# Patient Record
Sex: FEMALE | Race: WHITE | NOT HISPANIC OR LATINO | Employment: FULL TIME | URBAN - METROPOLITAN AREA
[De-identification: names, ages, dates, MRNs, and addresses within clinical notes are randomized per-mention and may not be internally consistent; named-entity substitution may affect disease eponyms.]

---

## 2016-08-25 LAB — EXTERNAL CHLAMYDIA SCREEN: NEGATIVE

## 2016-09-07 LAB — EXTERNAL ANTIBODY SCREEN: NEGATIVE

## 2017-02-15 LAB — EXTERNAL GROUP B STREP ANTIGEN: NEGATIVE

## 2017-02-16 ENCOUNTER — ROUTINE PRENATAL (OUTPATIENT)
Dept: OBSTETRICS AND GYNECOLOGY | Facility: CLINIC | Age: 27
End: 2017-02-16

## 2017-02-16 VITALS — DIASTOLIC BLOOD PRESSURE: 70 MMHG | WEIGHT: 181 LBS | BODY MASS INDEX: 34.2 KG/M2 | SYSTOLIC BLOOD PRESSURE: 118 MMHG

## 2017-02-16 DIAGNOSIS — Z3A.36 36 WEEKS GESTATION OF PREGNANCY: Primary | ICD-10-CM

## 2017-02-16 PROCEDURE — 99213 OFFICE O/P EST LOW 20 MIN: CPT | Performed by: OBSTETRICS & GYNECOLOGY

## 2017-02-16 RX ORDER — FERROUS SULFATE 325(65) MG
325 TABLET ORAL
COMMUNITY
End: 2022-12-13

## 2017-02-16 NOTE — PROGRESS NOTES
She has a sore right wrist and thumb probably from crafting and typing , no contractions  The baby was rushes 6-1/2 pounds 2 weeks ago on ultrasound.  Her cervix is posterior soft there is guarding I don't think it's dilated at all.  Her birth weight was 8#7 and his was 7-1/2 pounds.  Discussed the induction around 39 weeks as baby clinically appears to be large.  That may involve a James bulb.  I'll be out of town until the fourth.  Her EDC is 3/10/17- I have an off site clinic Monday, March 6.  We'll discuss further next week.  I'll have her see Renuka the week I am out of town February 27 through March 3

## 2017-02-23 ENCOUNTER — ROUTINE PRENATAL (OUTPATIENT)
Dept: OBSTETRICS AND GYNECOLOGY | Facility: CLINIC | Age: 27
End: 2017-02-23

## 2017-02-23 VITALS — DIASTOLIC BLOOD PRESSURE: 70 MMHG | WEIGHT: 182 LBS | BODY MASS INDEX: 34.39 KG/M2 | SYSTOLIC BLOOD PRESSURE: 118 MMHG

## 2017-02-23 DIAGNOSIS — Z3A.37 37 WEEKS GESTATION OF PREGNANCY: Primary | ICD-10-CM

## 2017-02-23 PROCEDURE — 0502F SUBSEQUENT PRENATAL CARE: CPT | Performed by: OBSTETRICS & GYNECOLOGY

## 2017-02-23 NOTE — PROGRESS NOTES
No contractions yet good FM MALE questionably birth weight I would assessment approximately 8 pounds at term  GBS is reportedly negative from Renuka we do not have the lab report.  I discussed that intercourse is certainly okay and might cause some contractions when the patient asked

## 2017-03-06 ENCOUNTER — HOSPITAL ENCOUNTER (INPATIENT)
Facility: HOSPITAL | Age: 27
LOS: 4 days | Discharge: HOME | End: 2017-03-10
Attending: OBSTETRICS & GYNECOLOGY | Admitting: OBSTETRICS & GYNECOLOGY

## 2017-03-06 DIAGNOSIS — Z98.891 STATUS POST CESAREAN SECTION: ICD-10-CM

## 2017-03-06 LAB
ABO GROUP BLD: NORMAL
BLD GP AB SCN SERPL QL: NEGATIVE
DEPRECATED RDW RBC AUTO: 48.5 FL (ref 37–54)
ERYTHROCYTE [DISTWIDTH] IN BLOOD BY AUTOMATED COUNT: 13.8 % (ref 11.3–14.5)
HCT VFR BLD AUTO: 37.6 % (ref 34.5–44)
HGB BLD-MCNC: 12.5 G/DL (ref 11.5–15.5)
MCH RBC QN AUTO: 31.8 PG (ref 27–31)
MCHC RBC AUTO-ENTMCNC: 33.2 G/DL (ref 32–36)
MCV RBC AUTO: 95.7 FL (ref 80–99)
PLATELET # BLD AUTO: 383 10*3/MM3 (ref 150–450)
PMV BLD AUTO: 10.1 FL (ref 6–12)
RBC # BLD AUTO: 3.93 10*6/MM3 (ref 3.89–5.14)
RH BLD: POSITIVE
WBC NRBC COR # BLD: 12.47 10*3/MM3 (ref 3.5–10.8)

## 2017-03-06 PROCEDURE — 86900 BLOOD TYPING SEROLOGIC ABO: CPT | Performed by: OBSTETRICS & GYNECOLOGY

## 2017-03-06 PROCEDURE — 86850 RBC ANTIBODY SCREEN: CPT | Performed by: OBSTETRICS & GYNECOLOGY

## 2017-03-06 PROCEDURE — 59025 FETAL NON-STRESS TEST: CPT

## 2017-03-06 PROCEDURE — 86901 BLOOD TYPING SEROLOGIC RH(D): CPT | Performed by: OBSTETRICS & GYNECOLOGY

## 2017-03-06 PROCEDURE — 85027 COMPLETE CBC AUTOMATED: CPT | Performed by: OBSTETRICS & GYNECOLOGY

## 2017-03-06 RX ORDER — PROMETHAZINE HYDROCHLORIDE 12.5 MG/1
12.5 SUPPOSITORY RECTAL EVERY 6 HOURS PRN
Status: DISCONTINUED | OUTPATIENT
Start: 2017-03-06 | End: 2017-03-07 | Stop reason: SDUPTHER

## 2017-03-06 RX ORDER — PROMETHAZINE HYDROCHLORIDE 25 MG/ML
12.5 INJECTION, SOLUTION INTRAMUSCULAR; INTRAVENOUS EVERY 6 HOURS PRN
Status: DISCONTINUED | OUTPATIENT
Start: 2017-03-06 | End: 2017-03-07 | Stop reason: SDUPTHER

## 2017-03-06 RX ORDER — ZOLPIDEM TARTRATE 5 MG/1
5 TABLET ORAL NIGHTLY PRN
Status: DISCONTINUED | OUTPATIENT
Start: 2017-03-06 | End: 2017-03-07 | Stop reason: SDUPTHER

## 2017-03-06 RX ORDER — DIPHENHYDRAMINE HCL 25 MG
25 CAPSULE ORAL NIGHTLY PRN
Status: DISCONTINUED | OUTPATIENT
Start: 2017-03-06 | End: 2017-03-07 | Stop reason: SDUPTHER

## 2017-03-06 RX ORDER — OXYTOCIN/RINGER'S LACTATE 30/500 ML
2-24 PLASTIC BAG, INJECTION (ML) INTRAVENOUS CONTINUOUS
Status: DISCONTINUED | OUTPATIENT
Start: 2017-03-06 | End: 2017-03-07

## 2017-03-06 RX ORDER — LIDOCAINE HYDROCHLORIDE 10 MG/ML
5 INJECTION, SOLUTION INFILTRATION; PERINEURAL AS NEEDED
Status: DISCONTINUED | OUTPATIENT
Start: 2017-03-06 | End: 2017-03-07 | Stop reason: HOSPADM

## 2017-03-06 RX ORDER — SODIUM CHLORIDE, SODIUM LACTATE, POTASSIUM CHLORIDE, CALCIUM CHLORIDE 600; 310; 30; 20 MG/100ML; MG/100ML; MG/100ML; MG/100ML
125 INJECTION, SOLUTION INTRAVENOUS CONTINUOUS
Status: DISCONTINUED | OUTPATIENT
Start: 2017-03-06 | End: 2017-03-07 | Stop reason: SDUPTHER

## 2017-03-06 RX ORDER — SODIUM CHLORIDE 0.9 % (FLUSH) 0.9 %
1-10 SYRINGE (ML) INJECTION AS NEEDED
Status: DISCONTINUED | OUTPATIENT
Start: 2017-03-06 | End: 2017-03-07 | Stop reason: HOSPADM

## 2017-03-06 RX ORDER — ACETAMINOPHEN 325 MG/1
650 TABLET ORAL EVERY 4 HOURS PRN
Status: DISCONTINUED | OUTPATIENT
Start: 2017-03-06 | End: 2017-03-07 | Stop reason: SDUPTHER

## 2017-03-06 RX ORDER — DIPHENHYDRAMINE HYDROCHLORIDE 50 MG/ML
25 INJECTION INTRAMUSCULAR; INTRAVENOUS NIGHTLY PRN
Status: DISCONTINUED | OUTPATIENT
Start: 2017-03-06 | End: 2017-03-07 | Stop reason: SDUPTHER

## 2017-03-06 RX ORDER — PROMETHAZINE HYDROCHLORIDE 12.5 MG/1
12.5 TABLET ORAL EVERY 6 HOURS PRN
Status: DISCONTINUED | OUTPATIENT
Start: 2017-03-06 | End: 2017-03-07 | Stop reason: SDUPTHER

## 2017-03-06 RX ADMIN — SODIUM CHLORIDE, POTASSIUM CHLORIDE, SODIUM LACTATE AND CALCIUM CHLORIDE 125 ML/HR: 600; 310; 30; 20 INJECTION, SOLUTION INTRAVENOUS at 22:05

## 2017-03-06 RX ADMIN — Medication 2 MILLI-UNITS/MIN: at 22:38

## 2017-03-07 ENCOUNTER — ANESTHESIA (OUTPATIENT)
Dept: LABOR AND DELIVERY | Facility: HOSPITAL | Age: 27
End: 2017-03-07

## 2017-03-07 ENCOUNTER — ANESTHESIA EVENT (OUTPATIENT)
Dept: LABOR AND DELIVERY | Facility: HOSPITAL | Age: 27
End: 2017-03-07

## 2017-03-07 PROBLEM — Z3A.39 39 WEEKS GESTATION OF PREGNANCY: Status: ACTIVE | Noted: 2017-02-16

## 2017-03-07 PROBLEM — Z34.03 ENCOUNTER FOR SUPERVISION OF NORMAL FIRST PREGNANCY IN THIRD TRIMESTER: Status: RESOLVED | Noted: 2017-03-07 | Resolved: 2017-03-07

## 2017-03-07 PROBLEM — Z34.03 ENCOUNTER FOR SUPERVISION OF NORMAL FIRST PREGNANCY IN THIRD TRIMESTER: Status: ACTIVE | Noted: 2017-03-07

## 2017-03-07 LAB
AMPHET+METHAMPHET UR QL: NEGATIVE
AMPHETAMINES UR QL: NEGATIVE
BARBITURATES UR QL SCN: NEGATIVE
BENZODIAZ UR QL SCN: NEGATIVE
BUPRENORPHINE SERPL-MCNC: NEGATIVE NG/ML
CANNABINOIDS SERPL QL: NEGATIVE
COCAINE UR QL: NEGATIVE
METHADONE UR QL SCN: NEGATIVE
OPIATES UR QL: NEGATIVE
OXYCODONE UR QL SCN: NEGATIVE
PCP UR QL SCN: NEGATIVE
PROPOXYPH UR QL: NEGATIVE
TRICYCLICS UR QL SCN: NEGATIVE

## 2017-03-07 PROCEDURE — 59514 CESAREAN DELIVERY ONLY: CPT | Performed by: OBSTETRICS & GYNECOLOGY

## 2017-03-07 PROCEDURE — 25010000002 BUTORPHANOL PER 1 MG: Performed by: OBSTETRICS & GYNECOLOGY

## 2017-03-07 PROCEDURE — C1755 CATHETER, INTRASPINAL: HCPCS

## 2017-03-07 PROCEDURE — 25010000002 ONDANSETRON PER 1 MG: Performed by: ANESTHESIOLOGY

## 2017-03-07 PROCEDURE — C1755 CATHETER, INTRASPINAL: HCPCS | Performed by: ANESTHESIOLOGY

## 2017-03-07 PROCEDURE — 25010000003 MORPHINE PER 10 MG: Performed by: ANESTHESIOLOGY

## 2017-03-07 PROCEDURE — 80306 DRUG TEST PRSMV INSTRMNT: CPT | Performed by: OBSTETRICS & GYNECOLOGY

## 2017-03-07 PROCEDURE — 25010000002 ROPIVACAINE PER 1 MG: Performed by: ANESTHESIOLOGY

## 2017-03-07 PROCEDURE — 25010000002 METOCLOPRAMIDE PER 10 MG: Performed by: ANESTHESIOLOGY

## 2017-03-07 PROCEDURE — 25010000002 MIDAZOLAM PER 1 MG: Performed by: ANESTHESIOLOGY

## 2017-03-07 PROCEDURE — 59025 FETAL NON-STRESS TEST: CPT

## 2017-03-07 PROCEDURE — 25010000002 FENTANYL CITRATE (PF) 100 MCG/2ML SOLUTION: Performed by: ANESTHESIOLOGY

## 2017-03-07 PROCEDURE — 25010000003 CEFAZOLIN IN DEXTROSE 2-4 GM/100ML-% SOLUTION: Performed by: OBSTETRICS & GYNECOLOGY

## 2017-03-07 RX ORDER — METHYLERGONOVINE MALEATE 0.2 MG/ML
200 INJECTION INTRAVENOUS AS NEEDED
Status: DISCONTINUED | OUTPATIENT
Start: 2017-03-07 | End: 2017-03-07 | Stop reason: HOSPADM

## 2017-03-07 RX ORDER — ACETAMINOPHEN 325 MG/1
650 TABLET ORAL EVERY 4 HOURS PRN
Status: DISCONTINUED | OUTPATIENT
Start: 2017-03-07 | End: 2017-03-07 | Stop reason: SDUPTHER

## 2017-03-07 RX ORDER — PROMETHAZINE HYDROCHLORIDE 12.5 MG/1
12.5 TABLET ORAL EVERY 6 HOURS PRN
Status: DISCONTINUED | OUTPATIENT
Start: 2017-03-07 | End: 2017-03-07 | Stop reason: HOSPADM

## 2017-03-07 RX ORDER — PRENATAL WITH FERROUS FUM AND FOLIC ACID 3080; 920; 120; 400; 22; 1.84; 3; 20; 10; 1; 12; 200; 27; 25; 2 [IU]/1; [IU]/1; MG/1; [IU]/1; MG/1; MG/1; MG/1; MG/1; MG/1; MG/1; UG/1; MG/1; MG/1; MG/1; MG/1
1 TABLET ORAL DAILY
Status: DISCONTINUED | OUTPATIENT
Start: 2017-03-08 | End: 2017-03-10 | Stop reason: HOSPADM

## 2017-03-07 RX ORDER — FAMOTIDINE 10 MG/ML
20 INJECTION, SOLUTION INTRAVENOUS ONCE AS NEEDED
Status: COMPLETED | OUTPATIENT
Start: 2017-03-07 | End: 2017-03-07

## 2017-03-07 RX ORDER — MORPHINE SULFATE 4 MG/ML
2 INJECTION, SOLUTION INTRAMUSCULAR; INTRAVENOUS
Status: DISCONTINUED | OUTPATIENT
Start: 2017-03-07 | End: 2017-03-07 | Stop reason: SDUPTHER

## 2017-03-07 RX ORDER — ACETAMINOPHEN 325 MG/1
650 TABLET ORAL EVERY 4 HOURS PRN
Status: DISCONTINUED | OUTPATIENT
Start: 2017-03-07 | End: 2017-03-07 | Stop reason: HOSPADM

## 2017-03-07 RX ORDER — OXYCODONE HYDROCHLORIDE AND ACETAMINOPHEN 5; 325 MG/1; MG/1
2 TABLET ORAL EVERY 4 HOURS PRN
Status: DISCONTINUED | OUTPATIENT
Start: 2017-03-07 | End: 2017-03-07 | Stop reason: HOSPADM

## 2017-03-07 RX ORDER — LIDOCAINE HYDROCHLORIDE 10 MG/ML
5 INJECTION, SOLUTION INFILTRATION; PERINEURAL AS NEEDED
Status: DISCONTINUED | OUTPATIENT
Start: 2017-03-07 | End: 2017-03-07 | Stop reason: HOSPADM

## 2017-03-07 RX ORDER — ZOLPIDEM TARTRATE 5 MG/1
5 TABLET ORAL NIGHTLY PRN
Status: DISCONTINUED | OUTPATIENT
Start: 2017-03-07 | End: 2017-03-10 | Stop reason: HOSPADM

## 2017-03-07 RX ORDER — DIPHENHYDRAMINE HYDROCHLORIDE 50 MG/ML
25 INJECTION INTRAMUSCULAR; INTRAVENOUS NIGHTLY PRN
Status: DISCONTINUED | OUTPATIENT
Start: 2017-03-07 | End: 2017-03-07 | Stop reason: HOSPADM

## 2017-03-07 RX ORDER — LIDOCAINE HYDROCHLORIDE AND EPINEPHRINE BITARTRATE 20; .01 MG/ML; MG/ML
INJECTION, SOLUTION SUBCUTANEOUS AS NEEDED
Status: DISCONTINUED | OUTPATIENT
Start: 2017-03-07 | End: 2017-03-07 | Stop reason: SURG

## 2017-03-07 RX ORDER — DIPHENHYDRAMINE HCL 25 MG
25 CAPSULE ORAL NIGHTLY PRN
Status: DISCONTINUED | OUTPATIENT
Start: 2017-03-07 | End: 2017-03-07 | Stop reason: HOSPADM

## 2017-03-07 RX ORDER — ZOLPIDEM TARTRATE 5 MG/1
5 TABLET ORAL NIGHTLY PRN
Status: DISCONTINUED | OUTPATIENT
Start: 2017-03-07 | End: 2017-03-07 | Stop reason: HOSPADM

## 2017-03-07 RX ORDER — SODIUM CHLORIDE, SODIUM LACTATE, POTASSIUM CHLORIDE, CALCIUM CHLORIDE 600; 310; 30; 20 MG/100ML; MG/100ML; MG/100ML; MG/100ML
125 INJECTION, SOLUTION INTRAVENOUS CONTINUOUS
Status: DISCONTINUED | OUTPATIENT
Start: 2017-03-07 | End: 2017-03-07 | Stop reason: SDUPTHER

## 2017-03-07 RX ORDER — CEFAZOLIN SODIUM 2 G/100ML
2 INJECTION, SOLUTION INTRAVENOUS EVERY 8 HOURS
Status: DISCONTINUED | OUTPATIENT
Start: 2017-03-08 | End: 2017-03-07

## 2017-03-07 RX ORDER — SODIUM CHLORIDE 0.9 % (FLUSH) 0.9 %
1-10 SYRINGE (ML) INJECTION AS NEEDED
Status: DISCONTINUED | OUTPATIENT
Start: 2017-03-07 | End: 2017-03-10 | Stop reason: HOSPADM

## 2017-03-07 RX ORDER — OXYCODONE HYDROCHLORIDE AND ACETAMINOPHEN 5; 325 MG/1; MG/1
1 TABLET ORAL EVERY 4 HOURS PRN
Status: DISCONTINUED | OUTPATIENT
Start: 2017-03-07 | End: 2017-03-08

## 2017-03-07 RX ORDER — PROMETHAZINE HYDROCHLORIDE 12.5 MG/1
12.5 TABLET ORAL EVERY 6 HOURS PRN
Status: DISCONTINUED | OUTPATIENT
Start: 2017-03-07 | End: 2017-03-07 | Stop reason: SDUPTHER

## 2017-03-07 RX ORDER — MIDAZOLAM HYDROCHLORIDE 1 MG/ML
INJECTION INTRAMUSCULAR; INTRAVENOUS AS NEEDED
Status: DISCONTINUED | OUTPATIENT
Start: 2017-03-07 | End: 2017-03-07 | Stop reason: SURG

## 2017-03-07 RX ORDER — MISOPROSTOL 200 UG/1
800 TABLET ORAL AS NEEDED
Status: DISCONTINUED | OUTPATIENT
Start: 2017-03-07 | End: 2017-03-07 | Stop reason: HOSPADM

## 2017-03-07 RX ORDER — PROMETHAZINE HYDROCHLORIDE 12.5 MG/1
12.5 SUPPOSITORY RECTAL EVERY 6 HOURS PRN
Status: DISCONTINUED | OUTPATIENT
Start: 2017-03-07 | End: 2017-03-10 | Stop reason: HOSPADM

## 2017-03-07 RX ORDER — DIPHENHYDRAMINE HYDROCHLORIDE 50 MG/ML
12.5 INJECTION INTRAMUSCULAR; INTRAVENOUS EVERY 8 HOURS PRN
Status: DISCONTINUED | OUTPATIENT
Start: 2017-03-07 | End: 2017-03-07 | Stop reason: HOSPADM

## 2017-03-07 RX ORDER — MORPHINE SULFATE 0.5 MG/ML
INJECTION, SOLUTION EPIDURAL; INTRATHECAL; INTRAVENOUS AS NEEDED
Status: DISCONTINUED | OUTPATIENT
Start: 2017-03-07 | End: 2017-03-07 | Stop reason: SURG

## 2017-03-07 RX ORDER — ONDANSETRON 2 MG/ML
4 INJECTION INTRAMUSCULAR; INTRAVENOUS EVERY 6 HOURS PRN
Status: COMPLETED | OUTPATIENT
Start: 2017-03-07 | End: 2017-03-07

## 2017-03-07 RX ORDER — PROMETHAZINE HYDROCHLORIDE 25 MG/1
25 TABLET ORAL EVERY 6 HOURS PRN
Status: DISCONTINUED | OUTPATIENT
Start: 2017-03-07 | End: 2017-03-10 | Stop reason: HOSPADM

## 2017-03-07 RX ORDER — EPHEDRINE SULFATE/0.9% NACL/PF 50 MG/10ML
5 SYRINGE (ML) INTRAVENOUS
Status: DISCONTINUED | OUTPATIENT
Start: 2017-03-07 | End: 2017-03-07 | Stop reason: HOSPADM

## 2017-03-07 RX ORDER — PROMETHAZINE HYDROCHLORIDE 12.5 MG/1
12.5 SUPPOSITORY RECTAL EVERY 6 HOURS PRN
Status: DISCONTINUED | OUTPATIENT
Start: 2017-03-07 | End: 2017-03-07 | Stop reason: HOSPADM

## 2017-03-07 RX ORDER — SODIUM CHLORIDE, SODIUM LACTATE, POTASSIUM CHLORIDE, CALCIUM CHLORIDE 600; 310; 30; 20 MG/100ML; MG/100ML; MG/100ML; MG/100ML
125 INJECTION, SOLUTION INTRAVENOUS CONTINUOUS
Status: DISCONTINUED | OUTPATIENT
Start: 2017-03-08 | End: 2017-03-10 | Stop reason: HOSPADM

## 2017-03-07 RX ORDER — IBUPROFEN 600 MG/1
600 TABLET ORAL EVERY 6 HOURS PRN
Status: DISCONTINUED | OUTPATIENT
Start: 2017-03-07 | End: 2017-03-07 | Stop reason: SDUPTHER

## 2017-03-07 RX ORDER — FENTANYL CITRATE 50 UG/ML
INJECTION, SOLUTION INTRAMUSCULAR; INTRAVENOUS AS NEEDED
Status: DISCONTINUED | OUTPATIENT
Start: 2017-03-07 | End: 2017-03-07 | Stop reason: SURG

## 2017-03-07 RX ORDER — SODIUM CHLORIDE 0.9 % (FLUSH) 0.9 %
1-10 SYRINGE (ML) INJECTION AS NEEDED
Status: DISCONTINUED | OUTPATIENT
Start: 2017-03-07 | End: 2017-03-07 | Stop reason: HOSPADM

## 2017-03-07 RX ORDER — OXYTOCIN/RINGER'S LACTATE 20/1000 ML
2 PLASTIC BAG, INJECTION (ML) INTRAVENOUS CONTINUOUS
Status: DISCONTINUED | OUTPATIENT
Start: 2017-03-08 | End: 2017-03-10 | Stop reason: HOSPADM

## 2017-03-07 RX ORDER — ROPIVACAINE HYDROCHLORIDE 5 MG/ML
INJECTION, SOLUTION EPIDURAL; INFILTRATION; PERINEURAL AS NEEDED
Status: DISCONTINUED | OUTPATIENT
Start: 2017-03-07 | End: 2017-03-07 | Stop reason: SURG

## 2017-03-07 RX ORDER — MORPHINE SULFATE 4 MG/ML
2 INJECTION, SOLUTION INTRAMUSCULAR; INTRAVENOUS
Status: DISCONTINUED | OUTPATIENT
Start: 2017-03-07 | End: 2017-03-07 | Stop reason: HOSPADM

## 2017-03-07 RX ORDER — CEFAZOLIN SODIUM 2 G/100ML
2 INJECTION, SOLUTION INTRAVENOUS ONCE
Status: COMPLETED | OUTPATIENT
Start: 2017-03-07 | End: 2017-03-07

## 2017-03-07 RX ORDER — IBUPROFEN 600 MG/1
600 TABLET ORAL EVERY 6 HOURS PRN
Status: DISCONTINUED | OUTPATIENT
Start: 2017-03-07 | End: 2017-03-08

## 2017-03-07 RX ORDER — OXYCODONE HYDROCHLORIDE AND ACETAMINOPHEN 5; 325 MG/1; MG/1
1 TABLET ORAL EVERY 4 HOURS PRN
Status: DISCONTINUED | OUTPATIENT
Start: 2017-03-07 | End: 2017-03-07 | Stop reason: SDUPTHER

## 2017-03-07 RX ORDER — PROMETHAZINE HYDROCHLORIDE 25 MG/ML
12.5 INJECTION, SOLUTION INTRAMUSCULAR; INTRAVENOUS EVERY 6 HOURS PRN
Status: DISCONTINUED | OUTPATIENT
Start: 2017-03-07 | End: 2017-03-07 | Stop reason: SDUPTHER

## 2017-03-07 RX ORDER — OXYTOCIN/RINGER'S LACTATE 20/1000 ML
999 PLASTIC BAG, INJECTION (ML) INTRAVENOUS ONCE
Status: DISCONTINUED | OUTPATIENT
Start: 2017-03-07 | End: 2017-03-07 | Stop reason: SDUPTHER

## 2017-03-07 RX ORDER — PROMETHAZINE HYDROCHLORIDE 25 MG/ML
12.5 INJECTION, SOLUTION INTRAMUSCULAR; INTRAVENOUS EVERY 6 HOURS PRN
Status: DISCONTINUED | OUTPATIENT
Start: 2017-03-07 | End: 2017-03-07 | Stop reason: HOSPADM

## 2017-03-07 RX ORDER — OXYCODONE HYDROCHLORIDE AND ACETAMINOPHEN 5; 325 MG/1; MG/1
1 TABLET ORAL EVERY 4 HOURS PRN
Status: DISCONTINUED | OUTPATIENT
Start: 2017-03-07 | End: 2017-03-07 | Stop reason: HOSPADM

## 2017-03-07 RX ORDER — OXYTOCIN 10 [USP'U]/ML
INJECTION, SOLUTION INTRAMUSCULAR; INTRAVENOUS AS NEEDED
Status: DISCONTINUED | OUTPATIENT
Start: 2017-03-07 | End: 2017-03-07 | Stop reason: SURG

## 2017-03-07 RX ORDER — METOCLOPRAMIDE HYDROCHLORIDE 5 MG/ML
10 INJECTION INTRAMUSCULAR; INTRAVENOUS ONCE AS NEEDED
Status: COMPLETED | OUTPATIENT
Start: 2017-03-07 | End: 2017-03-07

## 2017-03-07 RX ORDER — HYDROCODONE BITARTRATE AND ACETAMINOPHEN 5; 325 MG/1; MG/1
1 TABLET ORAL EVERY 4 HOURS PRN
Status: DISCONTINUED | OUTPATIENT
Start: 2017-03-07 | End: 2017-03-08

## 2017-03-07 RX ORDER — PROMETHAZINE HYDROCHLORIDE 25 MG/ML
12.5 INJECTION, SOLUTION INTRAMUSCULAR; INTRAVENOUS EVERY 6 HOURS PRN
Status: DISCONTINUED | OUTPATIENT
Start: 2017-03-07 | End: 2017-03-10 | Stop reason: HOSPADM

## 2017-03-07 RX ORDER — DOCUSATE SODIUM 100 MG/1
100 CAPSULE, LIQUID FILLED ORAL 2 TIMES DAILY PRN
Status: DISCONTINUED | OUTPATIENT
Start: 2017-03-07 | End: 2017-03-10 | Stop reason: HOSPADM

## 2017-03-07 RX ORDER — CARBOPROST TROMETHAMINE 250 UG/ML
250 INJECTION, SOLUTION INTRAMUSCULAR AS NEEDED
Status: DISCONTINUED | OUTPATIENT
Start: 2017-03-07 | End: 2017-03-07 | Stop reason: HOSPADM

## 2017-03-07 RX ORDER — OXYTOCIN/RINGER'S LACTATE 20/1000 ML
125 PLASTIC BAG, INJECTION (ML) INTRAVENOUS AS NEEDED
Status: DISCONTINUED | OUTPATIENT
Start: 2017-03-07 | End: 2017-03-07 | Stop reason: HOSPADM

## 2017-03-07 RX ORDER — IBUPROFEN 600 MG/1
600 TABLET ORAL EVERY 6 HOURS PRN
Status: DISCONTINUED | OUTPATIENT
Start: 2017-03-07 | End: 2017-03-07 | Stop reason: HOSPADM

## 2017-03-07 RX ORDER — PROMETHAZINE HYDROCHLORIDE 12.5 MG/1
12.5 SUPPOSITORY RECTAL EVERY 6 HOURS PRN
Status: DISCONTINUED | OUTPATIENT
Start: 2017-03-07 | End: 2017-03-07 | Stop reason: SDUPTHER

## 2017-03-07 RX ORDER — OXYTOCIN/RINGER'S LACTATE 20/1000 ML
999 PLASTIC BAG, INJECTION (ML) INTRAVENOUS ONCE
Status: COMPLETED | OUTPATIENT
Start: 2017-03-07 | End: 2017-03-07

## 2017-03-07 RX ORDER — LANOLIN 100 %
OINTMENT (GRAM) TOPICAL
Status: DISCONTINUED | OUTPATIENT
Start: 2017-03-07 | End: 2017-03-10 | Stop reason: HOSPADM

## 2017-03-07 RX ORDER — OXYTOCIN/RINGER'S LACTATE 20/1000 ML
125 PLASTIC BAG, INJECTION (ML) INTRAVENOUS AS NEEDED
Status: DISCONTINUED | OUTPATIENT
Start: 2017-03-07 | End: 2017-03-07 | Stop reason: SDUPTHER

## 2017-03-07 RX ORDER — SIMETHICONE 80 MG
80 TABLET,CHEWABLE ORAL
Status: DISCONTINUED | OUTPATIENT
Start: 2017-03-08 | End: 2017-03-10 | Stop reason: HOSPADM

## 2017-03-07 RX ADMIN — BUTORPHANOL TARTRATE 2 MG: 2 INJECTION, SOLUTION INTRAMUSCULAR; INTRAVENOUS at 06:25

## 2017-03-07 RX ADMIN — MIDAZOLAM HYDROCHLORIDE 1 MG: 1 INJECTION, SOLUTION INTRAMUSCULAR; INTRAVENOUS at 21:07

## 2017-03-07 RX ADMIN — CEFAZOLIN SODIUM 2 G: 2 INJECTION, SOLUTION INTRAVENOUS at 20:54

## 2017-03-07 RX ADMIN — OXYTOCIN 40 UNITS: 10 INJECTION, SOLUTION INTRAMUSCULAR; INTRAVENOUS at 21:26

## 2017-03-07 RX ADMIN — MORPHINE SULFATE 3 MG: 0.5 INJECTION, SOLUTION EPIDURAL; INTRATHECAL; INTRAVENOUS at 21:23

## 2017-03-07 RX ADMIN — LIDOCAINE HYDROCHLORIDE AND EPINEPHRINE 10 ML: 20; 10 INJECTION, SOLUTION INFILTRATION; PERINEURAL at 20:56

## 2017-03-07 RX ADMIN — FAMOTIDINE 20 MG: 10 INJECTION, SOLUTION INTRAVENOUS at 21:07

## 2017-03-07 RX ADMIN — OXYTOCIN 20 UNITS: 10 INJECTION, SOLUTION INTRAMUSCULAR; INTRAVENOUS at 21:21

## 2017-03-07 RX ADMIN — Medication 125 ML/HR: at 22:11

## 2017-03-07 RX ADMIN — ROPIVACAINE HYDROCHLORIDE 10 ML: 5 INJECTION, SOLUTION EPIDURAL; INFILTRATION; PERINEURAL at 12:35

## 2017-03-07 RX ADMIN — SODIUM CHLORIDE, POTASSIUM CHLORIDE, SODIUM LACTATE AND CALCIUM CHLORIDE: 600; 310; 30; 20 INJECTION, SOLUTION INTRAVENOUS at 20:39

## 2017-03-07 RX ADMIN — MORPHINE SULFATE 2 MG: 0.5 INJECTION, SOLUTION EPIDURAL; INTRATHECAL; INTRAVENOUS at 21:25

## 2017-03-07 RX ADMIN — ZOLPIDEM TARTRATE 5 MG: 5 TABLET, FILM COATED ORAL at 03:39

## 2017-03-07 RX ADMIN — ROPIVACAINE HYDROCHLORIDE 16 ML/HR: 5 INJECTION, SOLUTION EPIDURAL; INFILTRATION; PERINEURAL at 12:35

## 2017-03-07 RX ADMIN — ONDANSETRON 4 MG: 2 INJECTION INTRAMUSCULAR; INTRAVENOUS at 21:07

## 2017-03-07 RX ADMIN — FENTANYL CITRATE 100 MCG: 50 INJECTION, SOLUTION INTRAMUSCULAR; INTRAVENOUS at 12:35

## 2017-03-07 RX ADMIN — SODIUM CHLORIDE, POTASSIUM CHLORIDE, SODIUM LACTATE AND CALCIUM CHLORIDE 125 ML/HR: 600; 310; 30; 20 INJECTION, SOLUTION INTRAVENOUS at 04:39

## 2017-03-07 RX ADMIN — BUTORPHANOL TARTRATE 2 MG: 2 INJECTION, SOLUTION INTRAMUSCULAR; INTRAVENOUS at 11:19

## 2017-03-07 RX ADMIN — SODIUM CHLORIDE, POTASSIUM CHLORIDE, SODIUM LACTATE AND CALCIUM CHLORIDE 1000 ML: 600; 310; 30; 20 INJECTION, SOLUTION INTRAVENOUS at 20:39

## 2017-03-07 RX ADMIN — SODIUM CHLORIDE, POTASSIUM CHLORIDE, SODIUM LACTATE AND CALCIUM CHLORIDE 1000 ML: 600; 310; 30; 20 INJECTION, SOLUTION INTRAVENOUS at 12:15

## 2017-03-07 RX ADMIN — SODIUM CITRATE AND CITRIC ACID MONOHYDRATE 30 ML: 500; 334 SOLUTION ORAL at 20:57

## 2017-03-07 RX ADMIN — METOCLOPRAMIDE 10 MG: 5 INJECTION, SOLUTION INTRAMUSCULAR; INTRAVENOUS at 21:07

## 2017-03-07 RX ADMIN — IBUPROFEN 600 MG: 600 TABLET ORAL at 22:38

## 2017-03-07 RX ADMIN — BUTORPHANOL TARTRATE 2 MG: 2 INJECTION, SOLUTION INTRAMUSCULAR; INTRAVENOUS at 09:09

## 2017-03-07 NOTE — PROGRESS NOTES
She is now 4-5/100% / -2 station. I placed an IUPC posteriorly , good FHT's.   Jenn said that Dr Pruitt had called to check on the patient and I sent him an update recently.

## 2017-03-07 NOTE — H&P
" Tal Jones  : 1990  MRN: [id number]  CSN: [id number]    History and Physical    Subjective   Kenzie Jones is a 27 y.o. year old  with an Estimated Date of Delivery: 3/10/17 currently at 39w4d presenting with no contractions, leaking since 3/6 @ 19:30 and normal fetal movement.    Prenatal care has been with Renuka Bautista and Dr. Leblanc.  It has been benign.    Obstetric History       T0      TAB0   SAB0   E0   M0   L0       # Outcome Date GA Lbr Glynn/2nd Weight Sex Delivery Anes PTL Lv   1 Current                 History reviewed. No pertinent past medical history.  Past Surgical History   Procedure Laterality Date   • Appendectomy     • Cholecystectomy     • Vantage tooth extraction         No Known Allergies  Smoking status: Never Smoker                                                                 Smokeless status: Not on file                       Review of Systems   Constitutional: Negative for unexpected weight change.   Gastrointestinal: Negative for abdominal distention, constipation and diarrhea.        No persistent bloating   Genitourinary: Negative for difficulty urinating, dysuria, hematuria and urgency.        No significant incontinence   Skin:        No moles changing in size or color         Objective   Visit Vitals   • /67   • Pulse 94   • Temp 98 °F (36.7 °C) (Oral)   • Ht 61\" (154.9 cm)   • Wt 184 lb (83.5 kg)   • LMP 2016 (Exact Date)   • Breastfeeding Yes   • BMI 34.77 kg/m2     General: well developed; well nourished  no acute distress   Heart: Not performed.   Lungs: breathing is unlabored   Abdomen: soft, non-tender; no masses  no umbilical or inginual hernias are present  no hepato-splenomegaly   FHT's: reassuring and category 1   Cervix: was not checked.  On arrival she was 1 cm by RN   Presentation: cephalic on arrival   Contractions: none     Prenatal Labs  Lab Results   Component Value Date    HGB 12.5 2017    HEPBSAG Negative " 09/07/2016    ABSCRN Negative 03/06/2017       Current Labs Reviewed   No data reviewed       Assessment   1. IUP at 39w4d  2. Group B strep status: negative (per patient and confirmed thru [facility] GBS hotline)  3. PROM at term     Plan   1. Low dose pitocin last evening   2. Pitocin induction today    Ulysses Dickens MD  3/7/2017  6:13 AM

## 2017-03-07 NOTE — PLAN OF CARE
Problem: Patient Care Overview (Adult)  Goal: Plan of Care Review  Outcome: Ongoing (interventions implemented as appropriate)    03/07/17 0725   Coping/Psychosocial Response Interventions   Plan Of Care Reviewed With patient;spouse;family   Patient Care Overview   Progress progress toward functional goals as expected       Goal: Adult Individualization and Mutuality  Outcome: Ongoing (interventions implemented as appropriate)    03/07/17 0725   Individualization   Patient Specific Preferences Vaginal delivery   Patient Specific Goals pt wishes to breastfeed   Patient Specific Interventions epidural       Goal: Discharge Needs Assessment  Outcome: Ongoing (interventions implemented as appropriate)    03/07/17 0725   Discharge Needs Assessment   Concerns To Be Addressed no discharge needs identified   Readmission Within The Last 30 Days no previous admission in last 30 days   Equipment Needed After Discharge none   Discharge Disposition home or self-care   Current Health   Anticipated Changes Related to Illness none   Self-Care   Equipment Currently Used at Home none   Living Environment   Transportation Available car         Problem: Labor (Cervical Ripen, Induct, Augment) (Adult,Obstetrics,Pediatric)  Goal: Signs and Symptoms of Listed Potential Problems Will be Absent or Manageable (Labor)  Outcome: Ongoing (interventions implemented as appropriate)    03/07/17 0725   Labor (Cervical Ripen, Induct, Augment)   Problems Assessed (Labor) all   Problems Present (Labor) pain

## 2017-03-07 NOTE — ANESTHESIA PREPROCEDURE EVALUATION
Anesthesia Evaluation     Patient summary reviewed and Nursing notes reviewed      Airway   Mallampati: I  TM distance: <3 FB  Neck ROM: full  no difficulty expected  Dental - normal exam     Pulmonary - negative pulmonary ROS and normal exam   Cardiovascular - negative cardio ROS and normal exam        Neuro/Psych- negative ROS  GI/Hepatic/Renal/Endo - negative ROS     Musculoskeletal (-) negative ROS    Abdominal  - normal exam    Bowel sounds: normal.   Substance History - negative use     OB/GYN negative ob/gyn ROS         Other                                    Anesthesia Plan    ASA 2 - emergent     epidural     Anesthetic plan and risks discussed with patient.  Use of blood products discussed with patient .   Plan discussed with attending.

## 2017-03-07 NOTE — PLAN OF CARE
Problem: Patient Care Overview (Adult)  Goal: Plan of Care Review  Outcome: Ongoing (interventions implemented as appropriate)    03/06/17 2241   Coping/Psychosocial Response Interventions   Plan Of Care Reviewed With patient;spouse;family   Patient Care Overview   Progress progress toward functional goals as expected       Goal: Adult Individualization and Mutuality  Outcome: Ongoing (interventions implemented as appropriate)    03/06/17 2241   Individualization   Patient Specific Preferences breastfeed; epidural   Patient Specific Goals smooth delivery, healthy baby   Patient Specific Interventions pitocin augmentation       Goal: Discharge Needs Assessment  Outcome: Ongoing (interventions implemented as appropriate)    03/06/17 2241   Discharge Needs Assessment   Concerns To Be Addressed no discharge needs identified   Readmission Within The Last 30 Days no previous admission in last 30 days   Equipment Needed After Discharge none   Discharge Disposition home or self-care   Current Health   Anticipated Changes Related to Illness none   Self-Care   Equipment Currently Used at Home none   Living Environment   Transportation Available car;family or friend will provide         Problem: Labor (Cervical Ripen, Induct, Augment) (Adult,Obstetrics,Pediatric)  Goal: Signs and Symptoms of Listed Potential Problems Will be Absent or Manageable (Labor)  Outcome: Ongoing (interventions implemented as appropriate)    03/06/17 2241   Labor (Cervical Ripen, Induct, Augment)   Problems Assessed (Labor) all   Problems Present (Labor) none

## 2017-03-07 NOTE — ANESTHESIA PROCEDURE NOTES
Labor Epidural    Patient location during procedure: OB  Start Time: 3/7/2017 12:25 PM  Performed By  Anesthesiologist: EUGENIA PAGAN  Preanesthetic Checklist  Completed: patient identified, site marked, surgical consent, pre-op evaluation, timeout performed, risks and benefits discussed and monitors and equipment checked  Epidural Block Prep:  Pt Position:sitting  Sterile Tech:cap, gloves, mask and sterile barrier  Monitoring:blood pressure monitoring  Epidural Block Procedure:  Approach:midline  Guidance:landmark technique  Location:L3-L4  Needle Type:Tuohy  Needle Gauge:17 G  Loss of Resistance: 5cm  Cath Depth at skin:10 cm  Paresthesia: none  Aspiration:negative  Test Dose:negative  Post Assessment:  Dressing:occlusive dressing applied and secured with tape  Pt Tolerance:patient tolerated the procedure well with no apparent complications  Complications:no

## 2017-03-08 LAB
BASOPHILS # BLD AUTO: 0.02 10*3/MM3 (ref 0–0.2)
BASOPHILS NFR BLD AUTO: 0.1 % (ref 0–1)
DEPRECATED RDW RBC AUTO: 50.6 FL (ref 37–54)
EOSINOPHIL # BLD AUTO: 0.02 10*3/MM3 (ref 0.1–0.3)
EOSINOPHIL NFR BLD AUTO: 0.1 % (ref 0–3)
ERYTHROCYTE [DISTWIDTH] IN BLOOD BY AUTOMATED COUNT: 14.1 % (ref 11.3–14.5)
HCT VFR BLD AUTO: 34.6 % (ref 34.5–44)
HGB BLD-MCNC: 11.2 G/DL (ref 11.5–15.5)
IMM GRANULOCYTES # BLD: 0.09 10*3/MM3 (ref 0–0.03)
IMM GRANULOCYTES NFR BLD: 0.5 % (ref 0–0.6)
LYMPHOCYTES # BLD AUTO: 1.39 10*3/MM3 (ref 0.6–4.8)
LYMPHOCYTES NFR BLD AUTO: 8.3 % (ref 24–44)
MCH RBC QN AUTO: 31.8 PG (ref 27–31)
MCHC RBC AUTO-ENTMCNC: 32.4 G/DL (ref 32–36)
MCV RBC AUTO: 98.3 FL (ref 80–99)
MONOCYTES # BLD AUTO: 1.16 10*3/MM3 (ref 0–1)
MONOCYTES NFR BLD AUTO: 7 % (ref 0–12)
NEUTROPHILS # BLD AUTO: 14.01 10*3/MM3 (ref 1.5–8.3)
NEUTROPHILS NFR BLD AUTO: 84 % (ref 41–71)
PLATELET # BLD AUTO: 307 10*3/MM3 (ref 150–450)
PMV BLD AUTO: 9.8 FL (ref 6–12)
RBC # BLD AUTO: 3.52 10*6/MM3 (ref 3.89–5.14)
WBC NRBC COR # BLD: 16.69 10*3/MM3 (ref 3.5–10.8)

## 2017-03-08 PROCEDURE — 63710000001 DIPHENHYDRAMINE PER 50 MG: Performed by: ANESTHESIOLOGY

## 2017-03-08 PROCEDURE — 85025 COMPLETE CBC W/AUTO DIFF WBC: CPT | Performed by: OBSTETRICS & GYNECOLOGY

## 2017-03-08 RX ORDER — OXYCODONE HYDROCHLORIDE AND ACETAMINOPHEN 5; 325 MG/1; MG/1
2 TABLET ORAL EVERY 4 HOURS PRN
Status: ACTIVE | OUTPATIENT
Start: 2017-03-08 | End: 2017-03-09

## 2017-03-08 RX ORDER — DIPHENHYDRAMINE HCL 25 MG
25 CAPSULE ORAL EVERY 4 HOURS PRN
Status: DISCONTINUED | OUTPATIENT
Start: 2017-03-08 | End: 2017-03-10 | Stop reason: HOSPADM

## 2017-03-08 RX ORDER — DIPHENHYDRAMINE HYDROCHLORIDE 50 MG/ML
25 INJECTION INTRAMUSCULAR; INTRAVENOUS EVERY 4 HOURS PRN
Status: DISCONTINUED | OUTPATIENT
Start: 2017-03-08 | End: 2017-03-10 | Stop reason: HOSPADM

## 2017-03-08 RX ORDER — IBUPROFEN 600 MG/1
600 TABLET ORAL EVERY 6 HOURS PRN
Status: DISCONTINUED | OUTPATIENT
Start: 2017-03-09 | End: 2017-03-10 | Stop reason: HOSPADM

## 2017-03-08 RX ORDER — OXYCODONE HYDROCHLORIDE AND ACETAMINOPHEN 5; 325 MG/1; MG/1
1 TABLET ORAL EVERY 4 HOURS PRN
Status: DISCONTINUED | OUTPATIENT
Start: 2017-03-09 | End: 2017-03-10 | Stop reason: HOSPADM

## 2017-03-08 RX ORDER — MORPHINE SULFATE 2 MG/ML
2 INJECTION, SOLUTION INTRAMUSCULAR; INTRAVENOUS
Status: ACTIVE | OUTPATIENT
Start: 2017-03-08 | End: 2017-03-09

## 2017-03-08 RX ORDER — ONDANSETRON 2 MG/ML
4 INJECTION INTRAMUSCULAR; INTRAVENOUS ONCE AS NEEDED
Status: DISCONTINUED | OUTPATIENT
Start: 2017-03-08 | End: 2017-03-10 | Stop reason: HOSPADM

## 2017-03-08 RX ORDER — OXYCODONE AND ACETAMINOPHEN 7.5; 325 MG/1; MG/1
2 TABLET ORAL EVERY 4 HOURS PRN
Status: DISPENSED | OUTPATIENT
Start: 2017-03-08 | End: 2017-03-09

## 2017-03-08 RX ORDER — HYDROMORPHONE HYDROCHLORIDE 1 MG/ML
0.5 INJECTION, SOLUTION INTRAMUSCULAR; INTRAVENOUS; SUBCUTANEOUS
Status: ACTIVE | OUTPATIENT
Start: 2017-03-08 | End: 2017-03-09

## 2017-03-08 RX ORDER — IBUPROFEN 600 MG/1
600 TABLET ORAL EVERY 6 HOURS PRN
Status: DISPENSED | OUTPATIENT
Start: 2017-03-08 | End: 2017-03-09

## 2017-03-08 RX ORDER — HYDROCODONE BITARTRATE AND ACETAMINOPHEN 5; 325 MG/1; MG/1
1 TABLET ORAL EVERY 4 HOURS PRN
Status: DISCONTINUED | OUTPATIENT
Start: 2017-03-09 | End: 2017-03-10 | Stop reason: HOSPADM

## 2017-03-08 RX ORDER — NALOXONE HCL 0.4 MG/ML
0.4 VIAL (ML) INJECTION
Status: ACTIVE | OUTPATIENT
Start: 2017-03-08 | End: 2017-03-09

## 2017-03-08 RX ORDER — METOCLOPRAMIDE HYDROCHLORIDE 5 MG/ML
10 INJECTION INTRAMUSCULAR; INTRAVENOUS EVERY 4 HOURS PRN
Status: DISCONTINUED | OUTPATIENT
Start: 2017-03-08 | End: 2017-03-10 | Stop reason: HOSPADM

## 2017-03-08 RX ADMIN — OXYCODONE HYDROCHLORIDE AND ACETAMINOPHEN 2 TABLET: 7.5; 325 TABLET ORAL at 13:58

## 2017-03-08 RX ADMIN — PRENATAL WITH FERROUS FUM AND FOLIC ACID 1 TABLET: 3080; 920; 120; 400; 22; 1.84; 3; 20; 10; 1; 12; 200; 27; 25; 2 TABLET ORAL at 08:39

## 2017-03-08 RX ADMIN — SIMETHICONE CHEW TAB 80 MG 80 MG: 80 TABLET ORAL at 17:33

## 2017-03-08 RX ADMIN — OXYCODONE HYDROCHLORIDE AND ACETAMINOPHEN 2 TABLET: 7.5; 325 TABLET ORAL at 20:13

## 2017-03-08 RX ADMIN — DIPHENHYDRAMINE HYDROCHLORIDE 25 MG: 25 CAPSULE ORAL at 00:15

## 2017-03-08 RX ADMIN — IBUPROFEN 600 MG: 600 TABLET ORAL at 08:40

## 2017-03-08 RX ADMIN — SIMETHICONE CHEW TAB 80 MG 80 MG: 80 TABLET ORAL at 08:39

## 2017-03-08 RX ADMIN — SIMETHICONE CHEW TAB 80 MG 80 MG: 80 TABLET ORAL at 12:03

## 2017-03-08 RX ADMIN — SIMETHICONE CHEW TAB 80 MG 80 MG: 80 TABLET ORAL at 21:54

## 2017-03-08 NOTE — LACTATION NOTE
This note was copied from a baby's chart.     03/08/17 7863   Maternal Infant Feeding   Previous Breastfeeding History no   Current Delivery Breastfeeding History   Current Breastfeeding History yes   Current Breastfeeding Success successful  (gave booklet, offered services, mom reports baby nursing wel)   Equipment Type/Education   Breast Pump Type (gave rx and info for home pump )   encouraged mom to call for help with next feeding

## 2017-03-08 NOTE — LACTATION NOTE
"This note was copied from a baby's chart.     03/08/17 1230   Maternal Infant Assessment   Size Issue, Left Breast no   Nipple, Left graspable   Nipple Condition, Left intact   Infant Assessment   Sucking Reflex present   Rooting Reflex present   Swallow Reflex present   LATCH Score   Latch 2-->grasps breast, tongue down, lips flanged, rhythmic sucking   Audible Swallowing 1-->a few with stimulation   Type Of Nipple 2-->everted (after stimulation)   Comfort (Breast/Nipple) 2-->soft/nontender   Hold (Positioning) 1-->minimal assist, teach one side: mother does other, staff holds   Score (less than 7 for 2/more consecutive times, consult Lactation Consultant) 8   Maternal Infant Feeding   Infant Positioning clutch/\"football\"   Latch Assistance yes   Feeding Infant   Feeding Readiness Cues rooting   Effective Latch During Feeding yes   Skin-to-Skin Contact During Feeding yes     "

## 2017-03-08 NOTE — OP NOTE
DATE OF PROCEDURE:  2017    PREOPERATIVE DIAGNOSES:   1. Intrauterine pregnancy at 39 weeks.  2. Failure to progress.    POSTOPERATIVE DIAGNOSES:  1. Intrauterine pregnancy at 39 weeks.  2. Failure to progress.    PROCEDURE PERFORMED: Primary  section.     SURGEON: Kobe Shi MD    ANESTHESIA: Epidural.     INDICATIONS FOR PROCEDURE: The patient is a 27-year-old female  1, para 0, admitted to the hospital at 39-3/7 weeks with spontaneous rupture of membranes. The patient underwent low-dose Pitocin per protocol and after approximately 14 hours of Pitocin she was dilated to 6 cm, 100% effaced, but remained at a -2 station. The preoperative estimated fetal weight was around 8 pounds. The patient was taken to the operating room for a primary  section for failure to progress.     DESCRIPTION OF PROCEDURE: The patient was taken to the operating room where she was placed in a supine position. A satisfactory level of epidural anesthesia was achieved. The abdomen was prepped and draped in the usual fashion for abdominal surgery and a James catheter was anchored in the urinary bladder. A Pfannenstiel incision was made and extended through the skin and subcutaneous tissue and fascia. The peritoneum was picked up and incised. The peritoneal cavity was entered. The vesicouterine peritoneum was identified and bluntly and sharply dissected off the lower uterine segment. Low transverse uterine incision was made and the patient delivered a 7 pound 15 ounce male infant with Apgars of 8 at one minute and 9 at five minutes. Cord blood was obtained. The placenta was removed. The uterus was wiped clean of membranes. The lower uterine segment was repaired using 2 layers of interlocking #1 chromic and good hemostasis was obtained. All free blood was removed from the peritoneal cavity and the abdomen was closed in the following manner: The peritoneum with running 2-0 Vicryl, the muscles were approximated  using #1 chromic, the fascia with running 0 Vicryl, the subcutaneous tissue with 3-0 plain and the skin with staples. Estimated blood loss was 800 mL. Sponge count was correct. The patient tolerated the procedure well and left the operating room in good condition.         MD CARLOS A Andrea/brent  DD: 03/07/2017 22:03:40  DT: 03/07/2017 23:34:15  Voice Rec. ID #[id number]  Voice Original ID #[id number]  Doc ID #[id number]  Rev. #1 (dos)  cc:

## 2017-03-08 NOTE — ANESTHESIA POSTPROCEDURE EVALUATION
Patient: Kenzie Jones    Procedure Summary     Date Anesthesia Start Anesthesia Stop Room / Location    17 1225 2150 [facility]   [facility]       Procedure Diagnosis Surgeon Provider     SECTION PRIMARY (N/A Abdomen) No diagnosis on file. MD Gurinder Viera MD          Anesthesia Type: epidural  Last vitals  BP   105/56   Temp   97.9   Pulse  112   Resp   16   SpO2   98     Post Anesthesia Care and Evaluation    Patient location during evaluation: bedside  Patient participation: complete - patient participated  Level of consciousness: awake and alert  Pain score: 0  Pain management: adequate  Airway patency: patent  Anesthetic complications: No anesthetic complications    Cardiovascular status: acceptable  Respiratory status: acceptable  Hydration status: acceptable

## 2017-03-08 NOTE — PLAN OF CARE
Problem: Patient Care Overview (Adult)  Goal: Plan of Care Review  Outcome: Ongoing (interventions implemented as appropriate)    17 4911   Coping/Psychosocial Response Interventions   Plan Of Care Reviewed With patient   Patient Care Overview   Progress improving   Outcome Evaluation   Outcome Summary/Follow up Plan VSS, Small lochia. Pt. is voiding. Pain well controlled with PO meds. Abdomenial dressing clean and dry. Saline lock IV.        Goal: Adult Individualization and Mutuality  Outcome: Ongoing (interventions implemented as appropriate)  Goal: Discharge Needs Assessment  Outcome: Ongoing (interventions implemented as appropriate)    Problem: Breastfeeding (Adult,NICU,,Obstetrics,Pediatric)  Goal: Signs and Symptoms of Listed Potential Problems Will be Absent or Manageable (Breastfeeding)  Outcome: Ongoing (interventions implemented as appropriate)

## 2017-03-08 NOTE — ANESTHESIA POSTPROCEDURE EVALUATION
Patient: Kenzie Jones    Procedure Summary     Date Anesthesia Start Anesthesia Stop Room / Location    17 1225 2150 [facility]   [facility]       Procedure Diagnosis Surgeon Provider     SECTION PRIMARY (N/A Abdomen) No diagnosis on file. MD Gurinder Viera MD          Anesthesia Type: epidural  Last vitals  BP      Temp      Pulse     Resp      SpO2        Post Anesthesia Care and Evaluation    Patient location during evaluation: bedside  Patient participation: complete - patient participated  Level of consciousness: awake  Pain score: 0  Pain management: satisfactory to patient  Airway patency: patent  Anesthetic complications: No anesthetic complications    Cardiovascular status: acceptable  Respiratory status: acceptable  Hydration status: acceptable

## 2017-03-08 NOTE — L&D DELIVERY NOTE
[facility]   Section Operative Note    Pre-Operative Dx:   1.  IUP at 39 weeks   2. failure to progress   Failure to Progress    Postoperative dx:    1.  Same     Procedure: Procedure(s):   SECTION PRIMARY   Surgeon: Kobe Pruitt     Assistant: Dr Pedraza   Anesthesia: Epidural    EBL:    mls.  800 mL mls.         IV Fluids: 1500 mls.   UOP: 50 mls.        Antibiotics: cefazolin (Ancef)     Infant:      Name:            Gender: male  infant    Weight: 7 lb 15.4 oz (3.612 kg)     Apgars: 8   @ 1 minute /     9   @ 5 minutes    Cord gases: Venous:  @BABYNOHDR(BRIEFLAB, PHCVEN, BECVEN)@     Arterial:  @BABYNOHDR(BRIEFLAB, PHCART, BECART)@     Indication for C/Section:  Failure to Progress          Priority for C/Section:  Routine      Procedure Details:   Primary c section under epidural anesthesia with low transverse incision      Complications:   None      Disposition:   Mother to Mother Baby/Postpartum  in stable condition currently.   Baby to NBN  in stable condition currently.     Diction No is [id number]    Kobe Pruitt MD  3/8/2017  7:47 AM

## 2017-03-08 NOTE — PLAN OF CARE
Problem: Patient Care Overview (Adult)  Goal: Plan of Care Review  Outcome: Ongoing (interventions implemented as appropriate)    17   Coping/Psychosocial Response Interventions   Plan Of Care Reviewed With --  patient;spouse   Patient Care Overview   Progress progress toward functional goals as expected --        Goal: Adult Individualization and Mutuality  Outcome: Ongoing (interventions implemented as appropriate)    17   Individualization   Patient Specific Preferences breastfeed   Patient Specific Goals control pain, bond with baby   Patient Specific Interventions pain management       Goal: Discharge Needs Assessment  Outcome: Ongoing (interventions implemented as appropriate)    17   Discharge Needs Assessment   Concerns To Be Addressed --  no discharge needs identified   Readmission Within The Last 30 Days no previous admission in last 30 days --    Equipment Needed After Discharge none --    Current Health   Anticipated Changes Related to Illness none --    Self-Care   Equipment Currently Used at Home none --    Living Environment   Transportation Available car --          Problem: Labor (Cervical Ripen, Induct, Augment) (Adult,Obstetrics,Pediatric)  Goal: Signs and Symptoms of Listed Potential Problems Will be Absent or Manageable (Labor)  Outcome: Outcome(s) achieved Date Met:  17   Labor (Cervical Ripen, Induct, Augment)   Problems Assessed (Labor) all   Problems Present (Labor) none         Problem:  Delivery (Adult,Obstetrics,Pediatric)  Goal: Signs and Symptoms of Listed Potential Problems Will be Absent or Manageable ( Delivery)  Outcome: Outcome(s) achieved Date Met:  17    Delivery   Problems Assessed ( Delivery) all   Problems Present ( Delivery) none

## 2017-03-08 NOTE — PROGRESS NOTES
FHT's reactive, contractions q 2 to 3 minutes, Cx 6/100/-2, baby has been high all day. Will proceed with primary C section.    Kobe Pruitt MD

## 2017-03-08 NOTE — PROGRESS NOTES
3/8/2017  PPD #1    Subjective   Kenzie feels well.  Patient describes her lochia less than menses.  Pain is well controlled       Objective   Temp: Temp:  [97.9 °F (36.6 °C)-100.1 °F (37.8 °C)] 99.4 °F (37.4 °C) Temp src: Oral   BP: BP: ()/(46-87) 92/49        Pulse: Heart Rate:  [] 101  RR: Resp:  [16-18] 18    General:  No acute distress   Abdomen: Fundus firm and beneath umbilicus   Pelvis: deferred     Lab Results   Component Value Date    WBC 12.47 (H) 2017    HGB 12.5 2017    HCT 37.6 2017    MCV 95.7 2017     2017    HEPBSAG Negative 2016       Assessment  1. PPD# 1 after Primary  for FTP     Plan  1. Advance diet, d/c ivf, ambulate      This note has been electronically signed.    Chu Leblanc MD  2017

## 2017-03-08 NOTE — PLAN OF CARE
Problem: Patient Care Overview (Adult)  Goal: Plan of Care Review  Outcome: Ongoing (interventions implemented as appropriate)    Problem: Breastfeeding (Adult,NICU,[place],Obstetrics,Pediatric)  Goal: Signs and Symptoms of Listed Potential Problems Will be Absent or Manageable (Breastfeeding)  Outcome: Ongoing (interventions implemented as appropriate)

## 2017-03-09 RX ORDER — PROMETHAZINE HYDROCHLORIDE 25 MG/ML
12.5 INJECTION, SOLUTION INTRAMUSCULAR; INTRAVENOUS EVERY 6 HOURS PRN
Status: DISCONTINUED | OUTPATIENT
Start: 2017-03-09 | End: 2017-03-10 | Stop reason: HOSPADM

## 2017-03-09 RX ADMIN — IBUPROFEN 600 MG: 600 TABLET ORAL at 15:42

## 2017-03-09 RX ADMIN — SIMETHICONE CHEW TAB 80 MG 80 MG: 80 TABLET ORAL at 09:48

## 2017-03-09 RX ADMIN — PRENATAL WITH FERROUS FUM AND FOLIC ACID 1 TABLET: 3080; 920; 120; 400; 22; 1.84; 3; 20; 10; 1; 12; 200; 27; 25; 2 TABLET ORAL at 09:48

## 2017-03-09 RX ADMIN — OXYCODONE AND ACETAMINOPHEN 1 TABLET: 5; 325 TABLET ORAL at 15:42

## 2017-03-09 RX ADMIN — IBUPROFEN 600 MG: 600 TABLET ORAL at 05:12

## 2017-03-09 RX ADMIN — OXYCODONE AND ACETAMINOPHEN 1 TABLET: 5; 325 TABLET ORAL at 05:12

## 2017-03-09 RX ADMIN — DOCUSATE SODIUM 100 MG: 100 CAPSULE, LIQUID FILLED ORAL at 15:42

## 2017-03-09 RX ADMIN — SIMETHICONE CHEW TAB 80 MG 80 MG: 80 TABLET ORAL at 12:35

## 2017-03-09 RX ADMIN — SIMETHICONE CHEW TAB 80 MG 80 MG: 80 TABLET ORAL at 17:34

## 2017-03-09 NOTE — PROGRESS NOTES
3/9/2017    Name:Kenzie Jones    MR#:[id number]     PROGRESS NOTE:  Post-Op 2 S/P        Subjective   27 y.o. yo Female  s/p CS at 39w4d doing well. Pain well controlled. Normal GI function, passing flatus. Lochia normal. Ambulating and voiding.    Patient Active Problem List   Diagnosis   • 39 weeks gestation of pregnancy   • Failure to progress in labor        Objective    Vitals  Temp:  Temp:  [98.4 °F (36.9 °C)-98.9 °F (37.2 °C)] 98.8 °F (37.1 °C)  Temp src: Oral  BP:  BP: ()/(57-68) 104/68  Pulse:  Heart Rate:  [] 98  RR:   Resp:  [16-18] 18    General Awake, alert, no distress  Abdomen Soft, non-distended, fundus firm, below umbilicus, appropriately tender  Incision  Intact, no erythema or exudate  Extremities Calves NT bilaterally     I/O last 3 completed shifts:  In: 1500 [I.V.:1500]  Out: 5025 [Urine:4225; Blood:800]    LABS:   Lab Results   Component Value Date    WBC 16.69 (H) 2017    HGB 11.2 (L) 2017    HCT 34.6 2017    MCV 98.3 2017     2017       Infant: male       Assessment   1.  POD 2 from c/s    Plan: Home tomorrow      Chu Leblanc MD  3/9/2017 8:13 AM

## 2017-03-09 NOTE — PLAN OF CARE
Problem: Breastfeeding (Adult,NICU,,Obstetrics,Pediatric)  Goal: Signs and Symptoms of Listed Potential Problems Will be Absent or Manageable (Breastfeeding)  Outcome: Ongoing (interventions implemented as appropriate)  Increase frequency and length    17 8287   Breastfeeding   Problems Assessed (Breastfeeding) all   Problems Present (Breastfeeding) none

## 2017-03-10 VITALS
HEART RATE: 75 BPM | TEMPERATURE: 97.7 F | BODY MASS INDEX: 34.74 KG/M2 | DIASTOLIC BLOOD PRESSURE: 76 MMHG | RESPIRATION RATE: 18 BRPM | SYSTOLIC BLOOD PRESSURE: 116 MMHG | WEIGHT: 184 LBS | OXYGEN SATURATION: 99 % | HEIGHT: 61 IN

## 2017-03-10 RX ORDER — OXYCODONE HYDROCHLORIDE AND ACETAMINOPHEN 5; 325 MG/1; MG/1
1 TABLET ORAL EVERY 6 HOURS PRN
Qty: 30 TABLET | Refills: 0 | Status: SHIPPED | OUTPATIENT
Start: 2017-03-10 | End: 2017-03-19

## 2017-03-10 RX ORDER — IBUPROFEN 600 MG/1
600 TABLET ORAL EVERY 6 HOURS PRN
Qty: 40 TABLET | Refills: 1 | Status: SHIPPED | OUTPATIENT
Start: 2017-03-10 | End: 2017-03-27

## 2017-03-10 RX ADMIN — IBUPROFEN 600 MG: 600 TABLET ORAL at 10:08

## 2017-03-10 RX ADMIN — SIMETHICONE CHEW TAB 80 MG 80 MG: 80 TABLET ORAL at 10:08

## 2017-03-10 RX ADMIN — OXYCODONE AND ACETAMINOPHEN 1 TABLET: 5; 325 TABLET ORAL at 10:08

## 2017-03-10 NOTE — PLAN OF CARE
Problem: Patient Care Overview (Adult)  Goal: Plan of Care Review  Outcome: Ongoing (interventions implemented as appropriate)  Goal: Adult Individualization and Mutuality  Outcome: Ongoing (interventions implemented as appropriate)  Goal: Discharge Needs Assessment  Outcome: Ongoing (interventions implemented as appropriate)    Problem: Breastfeeding (Adult,NICU,[place],Obstetrics,Pediatric)  Goal: Signs and Symptoms of Listed Potential Problems Will be Absent or Manageable (Breastfeeding)  Outcome: Ongoing (interventions implemented as appropriate)

## 2017-03-10 NOTE — DISCHARGE SUMMARY
Discharge Summary    Date of Admission: 3/6/2017  Date of Discharge:  3/10/2017      Patient: Kenzie Jones      MR#:[id number]    Primary Surgeon/OB: Kobe Pruitt MD    Discharge Surgeon/OB:Benji    Presenting Problem/History of Present Illness  Labor without complication [O80]     Patient Active Problem List   Diagnosis   • 39 weeks gestation of pregnancy   • Failure to progress in labor         Discharge Diagnosis:  section at 39w4d    Procedures:  , Low Transverse     3/7/2017    9:20 PM        Rh Immune globulin given: not applicable    Rubella vaccine given: no    Discharge Date: 3/10/2017; Discharge Time: 7:57 AM    Early Discharge:  YES-I AUTHORIZE ONE MATERNAL- HOME VISIT    Hospital Course  Patient is a 27 y.o. female  at 39w4d status post  section with uneventful postoperative recovery.  Patient was advanced to regular diet on postoperative day#1.  On discharge, ambulating, tolerating a regular diet without any difficulties and her incision is dry, clean and intact.     Infant:   male  fetus 7 lb 15.4 oz (3.612 kg)  with Apgar scores of 8  , 9   at five minutes.    Condition on Discharge:  Stable    Vital Signs  Temp:  [97.8 °F (36.6 °C)-98.7 °F (37.1 °C)] 97.8 °F (36.6 °C)  Heart Rate:  [83-95] 88  Resp:  [16-18] 16  BP: (100-121)/(63-73) 100/63    Lab Results   Component Value Date    WBC 16.69 (H) 2017    HGB 11.2 (L) 2017    HCT 34.6 2017    MCV 98.3 2017     2017       Discharge Disposition  Home or Self Care    Discharge Medications   Kenzie Jones   Home Medication Instructions JASON:[id number]    Printed on:03/10/17 0756   Medication Information                      ferrous sulfate 325 (65 FE) MG tablet  Take 325 mg by mouth Daily With Breakfast.             ibuprofen (ADVIL,MOTRIN) 600 MG tablet  Take 1 tablet by mouth Every 6 (Six) Hours As Needed for Mild Pain (1-3).             oxyCODONE-acetaminophen (PERCOCET) 5-325 MG  per tablet  Take 1 tablet by mouth Every 6 (Six) Hours As Needed for severe pain (7-10) for up to 9 days.             Prenatal Vit-Fe Fumarate-FA (PRENATAL, CLASSIC, VITAMIN) 28-0.8 MG tablet tablet  Take 1 tablet by mouth Daily.                 Discharge Diet: regular    Activity at Discharge:   Activity Instructions     Pelvic Rest                     Follow-up Appointments  No future appointments.  Additional Instructions for the Follow-ups that You Need to Schedule     Discharge Follow-up with Specialty    As directed    Specialty:  OB Benji in 2 weeks [id number]   Follow Up:  2 Weeks                 Chu Leblanc MD  03/10/17  7:56 AM  Csd

## 2017-03-27 ENCOUNTER — POSTPARTUM VISIT (OUTPATIENT)
Dept: OBSTETRICS AND GYNECOLOGY | Facility: CLINIC | Age: 27
End: 2017-03-27

## 2017-03-27 VITALS
BODY MASS INDEX: 30.58 KG/M2 | DIASTOLIC BLOOD PRESSURE: 70 MMHG | SYSTOLIC BLOOD PRESSURE: 118 MMHG | WEIGHT: 162 LBS | HEIGHT: 61 IN

## 2017-03-27 DIAGNOSIS — Z09 SURGERY FOLLOW-UP: Primary | ICD-10-CM

## 2017-03-27 PROBLEM — Z98.891 H/O CESAREAN SECTION: Status: ACTIVE | Noted: 2017-03-27

## 2017-03-27 PROCEDURE — 99024 POSTOP FOLLOW-UP VISIT: CPT | Performed by: OBSTETRICS & GYNECOLOGY

## 2017-03-27 RX ORDER — NORETHINDRONE ACETATE AND ETHINYL ESTRADIOL, AND FERROUS FUMARATE 1MG-20(24)
1 KIT ORAL DAILY
Qty: 84 CAPSULE | Refills: 1 | Status: SHIPPED | OUTPATIENT
Start: 2017-03-27

## 2017-03-27 NOTE — PROGRESS NOTES
"Subjective   Chief Complaint   Patient presents with   • Postpartum Care     S/P c/s 3/7/17     Kenzie Jones is a 27 y.o. year old  presenting to be seen for her postpartum visit.  She had a  (LTCS).  Her pregnancy was significant for no problems.    Since delivery she has not been sexually active.  She does not have concerns about post-partum blues/depression.  She is bottle feeding.  For ongoing contraception, she is considering none for birth control.    No Additional Complaints Reported    The following portions of the patient's history were reviewed and updated as appropriate:current medications, allergies and past surgical history    Review of Systems normal bowels and bladder     Objective   /70  Ht 61\" (154.9 cm)  Wt 162 lb (73.5 kg)  LMP 2016 (Exact Date)  Breastfeeding? No  BMI 30.61 kg/m2    General:  well developed; well nourished  no acute distress   Breasts:  Not performed.   Abdomen: soft, non-tender; no masses  no umbilical or inginual hernias are present  no hepato-splenomegaly  incision is healing and healed   Pelvis: Not performed.          Assessment   1. Normal post op     Plan   1. OCP's    Medications Rx this encounter:  New Medications Ordered This Visit   Medications   • Norethin Ace-Eth Estrad-FE 1-20 MG-MCG(24) capsule     Sig: Take 1 tablet by mouth Daily.     Dispense:  84 capsule     Refill:  1          This note was electronically signed.    Chu Leblanc MD  2017    "

## 2019-10-03 ENCOUNTER — APPOINTMENT (OUTPATIENT)
Dept: CT IMAGING | Facility: HOSPITAL | Age: 29
End: 2019-10-03

## 2019-10-03 ENCOUNTER — HOSPITAL ENCOUNTER (EMERGENCY)
Facility: HOSPITAL | Age: 29
Discharge: HOME | End: 2019-10-04
Attending: EMERGENCY MEDICINE | Admitting: EMERGENCY MEDICINE

## 2019-10-03 DIAGNOSIS — S09.90XA MINOR HEAD INJURY, INITIAL ENCOUNTER: ICD-10-CM

## 2019-10-03 DIAGNOSIS — R55 SYNCOPE, UNSPECIFIED SYNCOPE TYPE: Primary | ICD-10-CM

## 2019-10-03 LAB
ALBUMIN SERPL-MCNC: 4.7 G/DL (ref 3.5–5.2)
ALBUMIN/GLOB SERPL: 1.6 G/DL
ALP SERPL-CCNC: 84 U/L (ref 39–117)
ALT SERPL W P-5'-P-CCNC: 30 U/L (ref 1–33)
ANION GAP SERPL CALCULATED.3IONS-SCNC: 14 MMOL/L (ref 5–15)
AST SERPL-CCNC: 26 U/L (ref 1–32)
BASOPHILS # BLD AUTO: 0.1 10*3/MM3 (ref 0–0.2)
BASOPHILS NFR BLD AUTO: 0.7 % (ref 0–1.5)
BILIRUB SERPL-MCNC: 0.3 MG/DL (ref 0.2–1.2)
BUN BLD-MCNC: 8 MG/DL (ref 6–20)
BUN/CREAT SERPL: 13.6 (ref 7–25)
CALCIUM SPEC-SCNC: 9.6 MG/DL (ref 8.6–10.5)
CHLORIDE SERPL-SCNC: 101 MMOL/L (ref 98–107)
CO2 SERPL-SCNC: 24 MMOL/L (ref 22–29)
CREAT BLD-MCNC: 0.59 MG/DL (ref 0.57–1)
DEPRECATED RDW RBC AUTO: 42.4 FL (ref 37–54)
EOSINOPHIL # BLD AUTO: 0.12 10*3/MM3 (ref 0–0.4)
EOSINOPHIL NFR BLD AUTO: 0.9 % (ref 0.3–6.2)
ERYTHROCYTE [DISTWIDTH] IN BLOOD BY AUTOMATED COUNT: 12.1 % (ref 12.3–15.4)
GFR SERPL CREATININE-BSD FRML MDRD: 121 ML/MIN/1.73
GLOBULIN UR ELPH-MCNC: 3 GM/DL
GLUCOSE BLD-MCNC: 103 MG/DL (ref 65–99)
HCT VFR BLD AUTO: 43.6 % (ref 34–46.6)
HGB BLD-MCNC: 14.3 G/DL (ref 12–15.9)
IMM GRANULOCYTES # BLD AUTO: 0.04 10*3/MM3 (ref 0–0.05)
IMM GRANULOCYTES NFR BLD AUTO: 0.3 % (ref 0–0.5)
LYMPHOCYTES # BLD AUTO: 2.19 10*3/MM3 (ref 0.7–3.1)
LYMPHOCYTES NFR BLD AUTO: 15.6 % (ref 19.6–45.3)
MCH RBC QN AUTO: 31.3 PG (ref 26.6–33)
MCHC RBC AUTO-ENTMCNC: 32.8 G/DL (ref 31.5–35.7)
MCV RBC AUTO: 95.4 FL (ref 79–97)
MONOCYTES # BLD AUTO: 1.14 10*3/MM3 (ref 0.1–0.9)
MONOCYTES NFR BLD AUTO: 8.1 % (ref 5–12)
NEUTROPHILS # BLD AUTO: 10.43 10*3/MM3 (ref 1.7–7)
NEUTROPHILS NFR BLD AUTO: 74.4 % (ref 42.7–76)
NRBC BLD AUTO-RTO: 0 /100 WBC (ref 0–0.2)
PLATELET # BLD AUTO: 495 10*3/MM3 (ref 140–450)
PMV BLD AUTO: 9 FL (ref 6–12)
POTASSIUM BLD-SCNC: 3.9 MMOL/L (ref 3.5–5.2)
PROT SERPL-MCNC: 7.7 G/DL (ref 6–8.5)
RBC # BLD AUTO: 4.57 10*6/MM3 (ref 3.77–5.28)
SODIUM BLD-SCNC: 139 MMOL/L (ref 136–145)
WBC NRBC COR # BLD: 14.02 10*3/MM3 (ref 3.4–10.8)

## 2019-10-03 PROCEDURE — 85025 COMPLETE CBC W/AUTO DIFF WBC: CPT | Performed by: EMERGENCY MEDICINE

## 2019-10-03 PROCEDURE — 93005 ELECTROCARDIOGRAM TRACING: CPT | Performed by: EMERGENCY MEDICINE

## 2019-10-03 PROCEDURE — 99285 EMERGENCY DEPT VISIT HI MDM: CPT

## 2019-10-03 PROCEDURE — 72125 CT NECK SPINE W/O DYE: CPT

## 2019-10-03 PROCEDURE — 96374 THER/PROPH/DIAG INJ IV PUSH: CPT

## 2019-10-03 PROCEDURE — 80053 COMPREHEN METABOLIC PANEL: CPT | Performed by: EMERGENCY MEDICINE

## 2019-10-03 PROCEDURE — 25010000002 HYDROMORPHONE PER 4 MG: Performed by: EMERGENCY MEDICINE

## 2019-10-03 PROCEDURE — 70450 CT HEAD/BRAIN W/O DYE: CPT

## 2019-10-03 PROCEDURE — 81025 URINE PREGNANCY TEST: CPT | Performed by: EMERGENCY MEDICINE

## 2019-10-03 RX ORDER — HYDROMORPHONE HYDROCHLORIDE 1 MG/ML
0.5 INJECTION, SOLUTION INTRAMUSCULAR; INTRAVENOUS; SUBCUTANEOUS ONCE
Status: COMPLETED | OUTPATIENT
Start: 2019-10-03 | End: 2019-10-03

## 2019-10-03 RX ORDER — SODIUM CHLORIDE 0.9 % (FLUSH) 0.9 %
10 SYRINGE (ML) INJECTION AS NEEDED
Status: DISCONTINUED | OUTPATIENT
Start: 2019-10-03 | End: 2019-10-04 | Stop reason: HOSPADM

## 2019-10-03 RX ADMIN — SODIUM CHLORIDE 1000 ML: 9 INJECTION, SOLUTION INTRAVENOUS at 23:05

## 2019-10-03 RX ADMIN — HYDROMORPHONE HYDROCHLORIDE 0.5 MG: 1 INJECTION, SOLUTION INTRAMUSCULAR; INTRAVENOUS; SUBCUTANEOUS at 23:38

## 2019-10-04 VITALS
HEIGHT: 61 IN | DIASTOLIC BLOOD PRESSURE: 85 MMHG | HEART RATE: 92 BPM | SYSTOLIC BLOOD PRESSURE: 124 MMHG | OXYGEN SATURATION: 96 % | TEMPERATURE: 98.3 F | BODY MASS INDEX: 33.99 KG/M2 | RESPIRATION RATE: 16 BRPM | WEIGHT: 180 LBS

## 2019-10-04 LAB
B-HCG UR QL: NEGATIVE
BACTERIA UR QL AUTO: ABNORMAL /HPF
BILIRUB UR QL STRIP: NEGATIVE
CLARITY UR: ABNORMAL
COLOR UR: YELLOW
GLUCOSE UR STRIP-MCNC: NEGATIVE MG/DL
HGB UR QL STRIP.AUTO: NEGATIVE
HYALINE CASTS UR QL AUTO: ABNORMAL /LPF
INTERNAL NEGATIVE CONTROL: NEGATIVE
INTERNAL POSITIVE CONTROL: POSITIVE
KETONES UR QL STRIP: NEGATIVE
LEUKOCYTE ESTERASE UR QL STRIP.AUTO: NEGATIVE
Lab: NORMAL
NITRITE UR QL STRIP: NEGATIVE
PH UR STRIP.AUTO: <=5 [PH] (ref 5–8)
PROT UR QL STRIP: NEGATIVE
RBC # UR: ABNORMAL /HPF
REF LAB TEST METHOD: ABNORMAL
SP GR UR STRIP: 1.02 (ref 1–1.03)
SQUAMOUS #/AREA URNS HPF: ABNORMAL /HPF
UROBILINOGEN UR QL STRIP: ABNORMAL
WBC UR QL AUTO: ABNORMAL /HPF

## 2019-10-04 PROCEDURE — 81001 URINALYSIS AUTO W/SCOPE: CPT | Performed by: EMERGENCY MEDICINE

## 2019-10-04 NOTE — ED PROVIDER NOTES
"Subjective   29-year-old female presents via EMS following syncopal episode at work tonight.  The patient states that she is otherwise healthy.  She works in a hot factory.  She states that tonight she was working when she began experiencing a sensation of \"wooziness.\"  She then passed out and does not recall having a syncopal episode.  She states that she reportedly hit the back of her head on the ground when falling to the ground and had positive loss of consciousness.  Following the event, she was experiencing headache and neck pain, so EMS was called, she was immobilized, and she was brought here for further evaluation and treatment.  The patient denies any paresthesias.  No prior history of syncope in the past.  No family history of sudden cardiac death.        History provided by:  Patient  Syncope   Episode history:  Single  Most recent episode:  Today  Progression:  Improving  Chronicity:  New  Relieved by:  None tried  Worsened by:  Nothing  Ineffective treatments:  None tried  Associated symptoms: dizziness and headaches    Associated symptoms: no chest pain and no shortness of breath        Review of Systems   Respiratory: Negative for shortness of breath.    Cardiovascular: Positive for syncope. Negative for chest pain.   Musculoskeletal: Negative for neck pain.   Neurological: Positive for dizziness and headaches.   All other systems reviewed and are negative.      Past Medical History:   Diagnosis Date   • Asthma        No Known Allergies    Past Surgical History:   Procedure Laterality Date   • APPENDECTOMY     •  SECTION N/A 3/7/2017    Procedure:  SECTION PRIMARY;  Surgeon: Kobe Pruitt MD;  Location: [facility] LABOR DELIVERY;  Service:    • LAPAROSCOPIC APPENDECTOMY     • LAPAROSCOPIC CHOLECYSTECTOMY     • WISDOM TOOTH EXTRACTION         Family History   Problem Relation Age of Onset   • Colon cancer Maternal Grandfather 63       Social History     Socioeconomic History "   • Marital status:      Spouse name: Not on file   • Number of children: Not on file   • Years of education: Not on file   • Highest education level: Not on file   Tobacco Use   • Smoking status: Never Smoker   • Smokeless tobacco: Never Used   Substance and Sexual Activity   • Alcohol use: No   • Drug use: No   • Sexual activity: Yes     Partners: Male     Birth control/protection: None         Objective   Physical Exam   Constitutional: She is oriented to person, place, and time. She appears well-developed and well-nourished. No distress.   Well-appearing female in no acute distress   HENT:   Head: Normocephalic and atraumatic.   Mouth/Throat: Oropharynx is clear and moist.   Eyes: EOM are normal. Pupils are equal, round, and reactive to light.   Neck:   Cervical collar in place   Cardiovascular: Normal rate, regular rhythm and normal heart sounds. Exam reveals no gallop and no friction rub.   No murmur heard.  Pulmonary/Chest: Effort normal and breath sounds normal. No respiratory distress. She has no wheezes. She has no rales.   Abdominal: Soft. Bowel sounds are normal. She exhibits no distension and no mass. There is no tenderness. There is no rebound and no guarding.   Musculoskeletal: Normal range of motion.   Neurological: She is alert and oriented to person, place, and time. No cranial nerve deficit or sensory deficit. Coordination normal.   5 out of 5 strength in all fours, neurovascularly intact distally in all fours of bounding distal pulses normal sensation   Skin: Skin is warm and dry. No rash noted. She is not diaphoretic. No erythema.   Psychiatric: She has a normal mood and affect. Judgment and thought content normal.   Nursing note and vitals reviewed.      Procedures         ED Course  ED Course as of Oct 04 0344   Thu Oct 03, 2019   2334 29-year-old female presents for evaluation following syncopal episode at work this evening.  She states that she works in a hot factory and had a  "syncopal episode while working tonight.  In the process, she fell to the ground, hit her head, and lost consciousness.  She was immobilized by EMS and brought here for further evaluation and treatment.  On arrival, patient well-appearing.  No family history of sudden cardiac death.  EKG revealed normal sinus rhythm with a heart rate of 85 and no ST segments suggestive of are concerning for ischemia with normal PA and QT intervals, no evidence of Brugada syndrome, and no findings concerning for hypertrophic cardiomyopathy.  CT head negative.  CT cervical spine negative.  C-collar cleared.  Labs unrevealing.  SF Syncope negative.  [DD]   Fri Oct 04, 2019   0153 CT head negative.  CT cervical spine negative.  C-collar cleared.  Upon reevaluation, patient asymptomatic.  Reassured and counseled regarding symptomatic management.  She will follow-up in the syncope clinic within the next 72 hours.  Agreeable with plan and given appropriate strict return precautions.  [DD]      ED Course User Index  [DD] Wilson Owens MD     No results found for this or any previous visit (from the past 24 hour(s)).  Note: In addition to lab results from this visit, the labs listed above may include labs taken at another facility or during a different encounter within the last 24 hours. Please correlate lab times with ED admission and discharge times for further clarification of the services performed during this visit.    CT Head Without Contrast    (Results Pending)   CT Cervical Spine Without Contrast    (Results Pending)     Vitals:    10/03/19 2159 10/03/19 2200 10/03/19 2204   BP:  (!) 141/103    BP Location:  Right arm    Patient Position:  Lying    Pulse:  98    Resp:  18    Temp:   98.3 °F (36.8 °C)   TempSrc:   Oral   SpO2:  98%    Weight: 81.6 kg (180 lb)     Height: 154.9 cm (61\")       Medications   sodium chloride 0.9 % flush 10 mL (not administered)   sodium chloride 0.9 % bolus 1,000 mL (not administered)     ECG/EMG " Results (last 24 hours)     Procedure Component Value Units Date/Time    ECG 12 Lead [[id number]] Collected:  10/03/19 2202     Updated:  10/03/19 2203        ECG 12 Lead         ECG 12 Lead    (Results Pending)               Recent Results (from the past 24 hour(s))   Comprehensive Metabolic Panel    Collection Time: 10/03/19 11:03 PM   Result Value Ref Range    Glucose 103 (H) 65 - 99 mg/dL    BUN 8 6 - 20 mg/dL    Creatinine 0.59 0.57 - 1.00 mg/dL    Sodium 139 136 - 145 mmol/L    Potassium 3.9 3.5 - 5.2 mmol/L    Chloride 101 98 - 107 mmol/L    CO2 24.0 22.0 - 29.0 mmol/L    Calcium 9.6 8.6 - 10.5 mg/dL    Total Protein 7.7 6.0 - 8.5 g/dL    Albumin 4.70 3.50 - 5.20 g/dL    ALT (SGPT) 30 1 - 33 U/L    AST (SGOT) 26 1 - 32 U/L    Alkaline Phosphatase 84 39 - 117 U/L    Total Bilirubin 0.3 0.2 - 1.2 mg/dL    eGFR Non African Amer 121 >60 mL/min/1.73    Globulin 3.0 gm/dL    A/G Ratio 1.6 g/dL    BUN/Creatinine Ratio 13.6 7.0 - 25.0    Anion Gap 14.0 5.0 - 15.0 mmol/L   CBC Auto Differential    Collection Time: 10/03/19 11:03 PM   Result Value Ref Range    WBC 14.02 (H) 3.40 - 10.80 10*3/mm3    RBC 4.57 3.77 - 5.28 10*6/mm3    Hemoglobin 14.3 12.0 - 15.9 g/dL    Hematocrit 43.6 34.0 - 46.6 %    MCV 95.4 79.0 - 97.0 fL    MCH 31.3 26.6 - 33.0 pg    MCHC 32.8 31.5 - 35.7 g/dL    RDW 12.1 (L) 12.3 - 15.4 %    RDW-SD 42.4 37.0 - 54.0 fl    MPV 9.0 6.0 - 12.0 fL    Platelets 495 (H) 140 - 450 10*3/mm3    Neutrophil % 74.4 42.7 - 76.0 %    Lymphocyte % 15.6 (L) 19.6 - 45.3 %    Monocyte % 8.1 5.0 - 12.0 %    Eosinophil % 0.9 0.3 - 6.2 %    Basophil % 0.7 0.0 - 1.5 %    Immature Grans % 0.3 0.0 - 0.5 %    Neutrophils, Absolute 10.43 (H) 1.70 - 7.00 10*3/mm3    Lymphocytes, Absolute 2.19 0.70 - 3.10 10*3/mm3    Monocytes, Absolute 1.14 (H) 0.10 - 0.90 10*3/mm3    Eosinophils, Absolute 0.12 0.00 - 0.40 10*3/mm3    Basophils, Absolute 0.10 0.00 - 0.20 10*3/mm3    Immature Grans, Absolute 0.04 0.00 - 0.05 10*3/mm3    nRBC 0.0  0.0 - 0.2 /100 WBC   Urinalysis With Microscopic If Indicated (No Culture) - Urine, Clean Catch    Collection Time: 10/04/19 12:33 AM   Result Value Ref Range    Color, UA Yellow Yellow, Straw    Appearance, UA Cloudy (A) Clear    pH, UA <=5.0 5.0 - 8.0    Specific Gravity, UA 1.023 1.001 - 1.030    Glucose, UA Negative Negative    Ketones, UA Negative Negative    Bilirubin, UA Negative Negative    Blood, UA Negative Negative    Protein, UA Negative Negative    Leuk Esterase, UA Negative Negative    Nitrite, UA Negative Negative    Urobilinogen, UA 1.0 E.U./dL 0.2 - 1.0 E.U./dL   Urinalysis, Microscopic Only - Urine, Clean Catch    Collection Time: 10/04/19 12:33 AM   Result Value Ref Range    RBC, UA 0-2 None Seen, 0-2 /HPF    WBC, UA 6-12 (A) None Seen, 0-2 /HPF    Bacteria, UA None Seen None Seen, Trace /HPF    Squamous Epithelial Cells, UA 3-6 (A) None Seen, 0-2 /HPF    Hyaline Casts, UA 7-12 0 - 6 /LPF    Methodology Manual Light Microscopy    POCT Pregnancy, Urine    Collection Time: 10/04/19 12:36 AM   Result Value Ref Range    HCG, Urine, QL Negative Negative    Lot Number [id number]     Internal Positive Control Positive     Internal Negative Control Negative      Note: In addition to lab results from this visit, the labs listed above may include labs taken at another facility or during a different encounter within the last 24 hours. Please correlate lab times with ED admission and discharge times for further clarification of the services performed during this visit.    CT Cervical Spine Without Contrast   Final Result   Normal CT scan cervical spine      Signer Name: Lamonte Danielle MD    Signed: 10/3/2019 10:55 PM    Workstation Name: [facility]      CT Head Without Contrast   Final Result   Normal, negative unenhanced head CT.               Signer Name: Lamonte Danielle MD    Signed: 10/3/2019 10:54 PM    Workstation Name: [facility]         Vitals:    10/04/19 0100 10/04/19 0102 10/04/19 0130 10/04/19 0224   BP: 116/59  124/85    BP Location:       Patient Position:       Pulse:    92   Resp:    16   Temp:       TempSrc:       SpO2:  95% 96%    Weight:       Height:         Medications   sodium chloride 0.9 % flush 10 mL (not administered)   sodium chloride 0.9 % bolus 1,000 mL (0 mL Intravenous Stopped 10/4/19 0051)   HYDROmorphone (DILAUDID) injection 0.5 mg (0.5 mg Intravenous Given 10/3/19 2338)     ECG/EMG Results (last 24 hours)     Procedure Component Value Units Date/Time    ECG 12 Lead [[id number]] Collected:  10/03/19 2202     Updated:  10/03/19 2203        ECG 12 Lead         ECG 12 Lead    (Results Pending)       Recent Results (from the past 24 hour(s))   Comprehensive Metabolic Panel    Collection Time: 10/03/19 11:03 PM   Result Value Ref Range    Glucose 103 (H) 65 - 99 mg/dL    BUN 8 6 - 20 mg/dL    Creatinine 0.59 0.57 - 1.00 mg/dL    Sodium 139 136 - 145 mmol/L    Potassium 3.9 3.5 - 5.2 mmol/L    Chloride 101 98 - 107 mmol/L    CO2 24.0 22.0 - 29.0 mmol/L    Calcium 9.6 8.6 - 10.5 mg/dL    Total Protein 7.7 6.0 - 8.5 g/dL    Albumin 4.70 3.50 - 5.20 g/dL    ALT (SGPT) 30 1 - 33 U/L    AST (SGOT) 26 1 - 32 U/L    Alkaline Phosphatase 84 39 - 117 U/L    Total Bilirubin 0.3 0.2 - 1.2 mg/dL    eGFR Non African Amer 121 >60 mL/min/1.73    Globulin 3.0 gm/dL    A/G Ratio 1.6 g/dL    BUN/Creatinine Ratio 13.6 7.0 - 25.0    Anion Gap 14.0 5.0 - 15.0 mmol/L   CBC Auto Differential    Collection Time: 10/03/19 11:03 PM   Result Value Ref Range    WBC 14.02 (H) 3.40 - 10.80 10*3/mm3    RBC 4.57 3.77 - 5.28 10*6/mm3    Hemoglobin 14.3 12.0 - 15.9 g/dL    Hematocrit 43.6 34.0 - 46.6 %    MCV 95.4 79.0 - 97.0 fL    MCH 31.3 26.6 - 33.0 pg    MCHC 32.8 31.5 - 35.7 g/dL    RDW 12.1 (L) 12.3 - 15.4 %    RDW-SD 42.4 37.0 - 54.0 fl    MPV 9.0 6.0 - 12.0 fL    Platelets 495 (H) 140 - 450 10*3/mm3    Neutrophil % 74.4 42.7 - 76.0 %    Lymphocyte %  15.6 (L) 19.6 - 45.3 %    Monocyte % 8.1 5.0 - 12.0 %    Eosinophil % 0.9 0.3 - 6.2 %    Basophil % 0.7 0.0 - 1.5 %    Immature Grans % 0.3 0.0 - 0.5 %    Neutrophils, Absolute 10.43 (H) 1.70 - 7.00 10*3/mm3    Lymphocytes, Absolute 2.19 0.70 - 3.10 10*3/mm3    Monocytes, Absolute 1.14 (H) 0.10 - 0.90 10*3/mm3    Eosinophils, Absolute 0.12 0.00 - 0.40 10*3/mm3    Basophils, Absolute 0.10 0.00 - 0.20 10*3/mm3    Immature Grans, Absolute 0.04 0.00 - 0.05 10*3/mm3    nRBC 0.0 0.0 - 0.2 /100 WBC   Urinalysis With Microscopic If Indicated (No Culture) - Urine, Clean Catch    Collection Time: 10/04/19 12:33 AM   Result Value Ref Range    Color, UA Yellow Yellow, Straw    Appearance, UA Cloudy (A) Clear    pH, UA <=5.0 5.0 - 8.0    Specific Gravity, UA 1.023 1.001 - 1.030    Glucose, UA Negative Negative    Ketones, UA Negative Negative    Bilirubin, UA Negative Negative    Blood, UA Negative Negative    Protein, UA Negative Negative    Leuk Esterase, UA Negative Negative    Nitrite, UA Negative Negative    Urobilinogen, UA 1.0 E.U./dL 0.2 - 1.0 E.U./dL   Urinalysis, Microscopic Only - Urine, Clean Catch    Collection Time: 10/04/19 12:33 AM   Result Value Ref Range    RBC, UA 0-2 None Seen, 0-2 /HPF    WBC, UA 6-12 (A) None Seen, 0-2 /HPF    Bacteria, UA None Seen None Seen, Trace /HPF    Squamous Epithelial Cells, UA 3-6 (A) None Seen, 0-2 /HPF    Hyaline Casts, UA 7-12 0 - 6 /LPF    Methodology Manual Light Microscopy    POCT Pregnancy, Urine    Collection Time: 10/04/19 12:36 AM   Result Value Ref Range    HCG, Urine, QL Negative Negative    Lot Number [id number]     Internal Positive Control Positive     Internal Negative Control Negative      Note: In addition to lab results from this visit, the labs listed above may include labs taken at another facility or during a different encounter within the last 24 hours. Please correlate lab times with ED admission and discharge times for further clarification of the services  performed during this visit.    CT Cervical Spine Without Contrast   Final Result   Normal CT scan cervical spine      Signer Name: Lamonte Danielle MD    Signed: 10/3/2019 10:55 PM    Workstation Name: [facility]      CT Head Without Contrast   Final Result   Normal, negative unenhanced head CT.               Signer Name: Lamonte Danielle MD    Signed: 10/3/2019 10:54 PM    Workstation Name: [facility]        Vitals:    10/04/19 0100 10/04/19 0102 10/04/19 0130 10/04/19 0224   BP: 116/59  124/85    BP Location:       Patient Position:       Pulse:    92   Resp:    16   Temp:       TempSrc:       SpO2:  95% 96%    Weight:       Height:         Medications   sodium chloride 0.9 % flush 10 mL (not administered)   sodium chloride 0.9 % bolus 1,000 mL (0 mL Intravenous Stopped 10/4/19 0051)   HYDROmorphone (DILAUDID) injection 0.5 mg (0.5 mg Intravenous Given 10/3/19 2338)     ECG/EMG Results (last 24 hours)     Procedure Component Value Units Date/Time    ECG 12 Lead [[id number]] Collected:  10/03/19 2202     Updated:  10/03/19 2203        ECG 12 Lead         ECG 12 Lead    (Results Pending)           MDM    Final diagnoses:   Syncope, unspecified syncope type   Minor head injury, initial encounter       Documentation assistance provided by rafat Capone.  Information recorded by the scribe was done at my direction and has been verified and validated by me.     Anne-Marie Capone  10/03/19 0609       Wilson Owens MD  10/04/19 8679

## 2019-10-04 NOTE — DISCHARGE INSTRUCTIONS
Follow up with one of the [facility] Primary Care Providers below to setup primary care. If you need assistance coordinating a primary care appointment with a [facility] Primary Care Provider, please contact the Primary Care Coordinators at [phone] for appointment scheduling.    [facility], Primary Care   [address] , [address]   [place], [place] [address] [phone]    [facility] Internal Medicine & Endocrinology  [address], [address]  [place] [address] [phone]    [facility] Family Medicine  [address], [address]   [place] [address] [phone]    [facility] Primary Care  [address], [address]  [place], [place] [id number] [phone]    [facility], Primary Care,   [address], [address]   [place], [place] [address] [phone]    [facility] Primary Care  [address]  [place], [place] [address] [phone]    [facility], Primary Care  [address], [address]  [place], [place] [id number] [phone]    [facility] Internal Medicine & Pediatrics  [address], [address]   [place] [address] [phone]    [facility], Primary Care  [address] [place] [address] [phone]      [facility] Primary Care  [address], [address]   [place] [address] [phone]    [facility] Family Medicine  [address] [place] [address] [place] [address] [phone]     Follow up in 1 week

## 2019-10-06 ENCOUNTER — APPOINTMENT (OUTPATIENT)
Dept: GENERAL RADIOLOGY | Facility: HOSPITAL | Age: 29
End: 2019-10-06

## 2019-10-06 ENCOUNTER — HOSPITAL ENCOUNTER (EMERGENCY)
Facility: HOSPITAL | Age: 29
Discharge: HOME | End: 2019-10-06
Attending: EMERGENCY MEDICINE | Admitting: EMERGENCY MEDICINE

## 2019-10-06 VITALS
HEART RATE: 89 BPM | TEMPERATURE: 98.7 F | DIASTOLIC BLOOD PRESSURE: 72 MMHG | HEIGHT: 61 IN | BODY MASS INDEX: 33.99 KG/M2 | OXYGEN SATURATION: 97 % | WEIGHT: 180 LBS | SYSTOLIC BLOOD PRESSURE: 114 MMHG | RESPIRATION RATE: 14 BRPM

## 2019-10-06 DIAGNOSIS — S16.1XXA STRAIN OF NECK MUSCLE, INITIAL ENCOUNTER: ICD-10-CM

## 2019-10-06 DIAGNOSIS — M54.50 THORACOLUMBAR BACK PAIN: ICD-10-CM

## 2019-10-06 DIAGNOSIS — F07.81 POSTCONCUSSIVE SYNDROME: Primary | ICD-10-CM

## 2019-10-06 DIAGNOSIS — M54.6 THORACOLUMBAR BACK PAIN: ICD-10-CM

## 2019-10-06 LAB
B-HCG UR QL: NEGATIVE
INTERNAL NEGATIVE CONTROL: NEGATIVE
INTERNAL POSITIVE CONTROL: POSITIVE
Lab: NORMAL

## 2019-10-06 PROCEDURE — 25010000002 METOCLOPRAMIDE PER 10 MG: Performed by: PHYSICIAN ASSISTANT

## 2019-10-06 PROCEDURE — 25010000002 DIPHENHYDRAMINE PER 50 MG: Performed by: PHYSICIAN ASSISTANT

## 2019-10-06 PROCEDURE — 96374 THER/PROPH/DIAG INJ IV PUSH: CPT

## 2019-10-06 PROCEDURE — 72072 X-RAY EXAM THORAC SPINE 3VWS: CPT

## 2019-10-06 PROCEDURE — 72100 X-RAY EXAM L-S SPINE 2/3 VWS: CPT

## 2019-10-06 PROCEDURE — 81025 URINE PREGNANCY TEST: CPT | Performed by: EMERGENCY MEDICINE

## 2019-10-06 PROCEDURE — 96375 TX/PRO/DX INJ NEW DRUG ADDON: CPT

## 2019-10-06 PROCEDURE — 99283 EMERGENCY DEPT VISIT LOW MDM: CPT

## 2019-10-06 RX ORDER — DIPHENHYDRAMINE HYDROCHLORIDE 50 MG/ML
12.5 INJECTION INTRAMUSCULAR; INTRAVENOUS ONCE
Status: COMPLETED | OUTPATIENT
Start: 2019-10-06 | End: 2019-10-06

## 2019-10-06 RX ORDER — METHOCARBAMOL 500 MG/1
500 TABLET, FILM COATED ORAL 3 TIMES DAILY
Qty: 14 TABLET | Refills: 0 | Status: SHIPPED | OUTPATIENT
Start: 2019-10-06 | End: 2022-06-20

## 2019-10-06 RX ORDER — METOCLOPRAMIDE HYDROCHLORIDE 5 MG/ML
5 INJECTION INTRAMUSCULAR; INTRAVENOUS ONCE
Status: COMPLETED | OUTPATIENT
Start: 2019-10-06 | End: 2019-10-06

## 2019-10-06 RX ORDER — METHOCARBAMOL 750 MG/1
750 TABLET, FILM COATED ORAL 4 TIMES DAILY
Status: DISCONTINUED | OUTPATIENT
Start: 2019-10-06 | End: 2019-10-06 | Stop reason: HOSPADM

## 2019-10-06 RX ORDER — KETOROLAC TROMETHAMINE 30 MG/ML
30 INJECTION, SOLUTION INTRAMUSCULAR; INTRAVENOUS ONCE
Status: DISCONTINUED | OUTPATIENT
Start: 2019-10-06 | End: 2019-10-06

## 2019-10-06 RX ADMIN — METOCLOPRAMIDE 5 MG: 5 INJECTION, SOLUTION INTRAMUSCULAR; INTRAVENOUS at 10:02

## 2019-10-06 RX ADMIN — DIPHENHYDRAMINE HYDROCHLORIDE 12.5 MG: 50 INJECTION INTRAMUSCULAR; INTRAVENOUS at 10:02

## 2019-10-06 RX ADMIN — METHOCARBAMOL 750 MG: 750 TABLET ORAL at 10:02

## 2019-10-06 NOTE — DISCHARGE INSTRUCTIONS
You were seen for head, neck, back pain after a fall and injury.  You likely have postconcussive syndrome and musculoskeletal pain.  Return here for any worsening of your symptoms, weakness, numbness, tingling.  Return here for recurrent syncope which you have previously been worked up for.  You had some changes on your x-ray and you need to have a repeat x-ray with your PCP.  He also had some calcifications near your lumbar spine which need to be followed.  Please work on your follow-up with the syncope clinic and the occupational medicine clinic.

## 2019-10-06 NOTE — ED PROVIDER NOTES
"Subjective   Patient presents complaining of head, neck, back pain.  She reports that she had a syncopal episode on Thursday while at work.  She was brought here and evaluated.  She reports that she continues to have head and neck pain.  She has had ibuprofen without any improvement.  Her symptoms are worse with laying down.  She has not been able to sleep due to discomfort.  She reports generalized, aching pain in her head.  She denies any change in her head pain since her evaluation here.  She reports that she woke up with pain after the syncopal episode.  Denies worst headache of her life.  She reports a \"twinge\" of pain between her shoulder blades and dull pain in the lumbar region.  Denies any fever, vomiting, numbness tingling or weakness.  Denies bowel or bladder incontinence or saddle anesthesias.        Headache   Pain location:  Generalized  Quality: ache.  Radiates to:  Does not radiate  Severity currently:  9/10  Onset quality: woke up with pain after syncope/ head injury.  Duration:  3 days  Timing:  Constant  Progression:  Unchanged  Chronicity:  New  Context comment:  Injury  Relieved by:  Nothing  Exacerbated by: laying down.  Ineffective treatments:  NSAIDs  Associated symptoms: back pain and neck pain    Associated symptoms: no fever, no nausea and no vomiting    Back Pain   Location:  Thoracic spine and lumbar spine  Quality: dull.  Radiates to:  Does not radiate  Pain severity:  Moderate  Pain is:  Worse during the night  Onset quality:  Sudden  Duration:  3 days  Timing:  Constant  Progression:  Unchanged  Chronicity:  New  Context: falling    Relieved by:  Nothing  Worsened by:  Lying down  Ineffective treatments:  NSAIDs  Associated symptoms: headaches    Associated symptoms: no fever        Review of Systems   Constitutional: Negative for chills and fever.   Gastrointestinal: Negative for nausea and vomiting.   Musculoskeletal: Positive for back pain and neck pain.   Neurological: Positive " for headaches.   All other systems reviewed and are negative.      Past Medical History:   Diagnosis Date   • Asthma        No Known Allergies    Past Surgical History:   Procedure Laterality Date   • APPENDECTOMY     •  SECTION N/A 3/7/2017    Procedure:  SECTION PRIMARY;  Surgeon: Kobe Pruitt MD;  Location: [facility] LABOR DELIVERY;  Service:    • LAPAROSCOPIC APPENDECTOMY     • LAPAROSCOPIC CHOLECYSTECTOMY     • WISDOM TOOTH EXTRACTION         Family History   Problem Relation Age of Onset   • Colon cancer Maternal Grandfather 63       Social History     Socioeconomic History   • Marital status:      Spouse name: Not on file   • Number of children: Not on file   • Years of education: Not on file   • Highest education level: Not on file   Tobacco Use   • Smoking status: Never Smoker   • Smokeless tobacco: Never Used   Substance and Sexual Activity   • Alcohol use: No   • Drug use: No   • Sexual activity: Yes     Partners: Male     Birth control/protection: None           Objective   Physical Exam   Constitutional: She is oriented to person, place, and time. She appears well-developed and well-nourished.   HENT:   Head: Normocephalic and atraumatic.   Right Ear: External ear normal.   Left Ear: External ear normal.   Mouth/Throat: Oropharynx is clear and moist.   Eyes: Conjunctivae and EOM are normal. Pupils are equal, round, and reactive to light.   Neck: Normal range of motion.   Generalized cervical tenderness, FROM   Cardiovascular: Normal rate, regular rhythm and normal heart sounds. Exam reveals no gallop and no friction rub.   No murmur heard.  Pulmonary/Chest: Effort normal and breath sounds normal. No stridor. No respiratory distress. She has no wheezes.   Abdominal: Soft. Bowel sounds are normal. She exhibits no distension. There is no tenderness. There is no rebound.   Musculoskeletal: Normal range of motion. She exhibits tenderness.   Generalized spinal, perispinal  and myofascial thoracic and lumbar tenderness    Neurological: She is alert and oriented to person, place, and time. She displays normal reflexes. No cranial nerve deficit or sensory deficit. She exhibits normal muscle tone. Coordination normal.   Skin: Skin is warm and dry.   Psychiatric: She has a normal mood and affect. Her behavior is normal.       Procedures           ED Course  ED Course as of Oct 06 1143   Sun Oct 06, 2019   0936 HCG, Urine QL: Negative [WT]   1142 Thoracic/ lumbar xr  Impression       No convincing radiographic evidence of thoracic or lumbar fracture  and/or dislocation.     Incidental findings including left perihilar fullness/airspace changes  favored to represent vascular crowding in the setting of low lung  volumes however consider follow-up PA and lateral chest radiograph for  further evaluation.     Nonspecific right paraspinal calcifications adjacent to the right L5  transverse process of undetermined etiology.  Correlate clinically.         [WT]      ED Course User Index  [WT] Gina Cadet, IMAN                  MDM  Number of Diagnoses or Management Options  Postconcussive syndrome:   Strain of neck muscle, initial encounter:   Thoracolumbar back pain:   Diagnosis management comments: Patient presents complaining of head, neck, back pain after a fall.  She was evaluated here after syncopal episode.  She had a negative CT brain and C-spine at her previous visit.  She reports her headache is unchanged.  Her neurologic exam is within normal limits.  I feel that her headache represents postconcussive headache.  She does not take any blood thinners and with no change since her CT scan I doubt developed any intracranial hemorrhage.  She has generalized thoracic and lumbar tenderness.  No bowel or bladder incontinence or saddle anesthesias. Reflexes intact. She has not had any imaging of her thoracic and lumbar spine.  I will add an x-ray of these regions.  She will be treated with  Benadryl, Reglan, Robaxin for her head and back pain.  Patient reports significant improvement.  She has full range of motion of her neck and is nontender on repeat exam.  Thoracic and lumbar x-rays are negative for fracture.  There were some nonemergent findings which patient was advised to follow-up about. Patient will be treated with Robaxin for her musculoskeletal pain.  She was given return precautions, follow-up with the occupational medicine group and advised to keep her follow-up with the syncope clinic.  She was again given the phone number for the syncope clinic if she does not hear from them tomorrow.      Final diagnoses:   Postconcussive syndrome   Thoracolumbar back pain   Strain of neck muscle, initial encounter              Gina Cadet PA-C  10/06/19 1143       Gina Cadet PA-C  10/06/19 1144

## 2019-10-10 NOTE — PROGRESS NOTES
"[facility]  Heart and Valve Center      Encounter Date:10/11/2019     Kenzie Jones  [address] [address]  [unfilled]    1990    Provider, No Known    Kenzie Jones is a 29 y.o. female.      Subjective:     Chief Complaint:  Loss of Consciousness and Establish Care       HPI   Patient is a 29-year-old female who presents to the Heart and Valve Center for evaluation of syncope at the request of Dr. Owens.  Patient presented to the ED on 10/3/2019 after syncopal episode while at work.  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 She says she was very diaphoretic and thirsty. 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   She reports she began feeling like the \"blood was rushing to her ears\" and clamminess then passed out.  She has no memory of this event.  She reportedly hit the back of her head on the ground when falling and following the event she experienced headache and neck pain.  Work-up in ED was unremarkable.  Presented back to the ED on 10/6/2019 with complaining of head, neck and back pain.  She reports she was unable to sleep due to discomfort in her neck and head.  She describes a generalized aching pain in her head.  Had a negative CT brain and C-spine. She has no history of syncope. She is back to work and still having headaches. She reports that she still has dizzy episodes and feels forgetful. She     Patient Active Problem List   Diagnosis   • 39 weeks gestation of pregnancy   • Failure to progress in labor   • H/O  section 3/7/17 Male       Past Medical History:   Diagnosis Date   • Asthma        Past Surgical History:   Procedure Laterality Date   • APPENDECTOMY     •  SECTION N/A 3/7/2017    Procedure:  SECTION PRIMARY;  Surgeon: Kobe Pruitt MD;  Location: [facility] LABOR DELIVERY;  Service:    • LAPAROSCOPIC APPENDECTOMY     • LAPAROSCOPIC CHOLECYSTECTOMY     • WISDOM TOOTH " EXTRACTION  2002       Family History   Problem Relation Age of Onset   • Colon cancer Maternal Grandfather 63   • Hypertension Mother    • Thyroid disease Father    • Hyperlipidemia Father    • No Known Problems Sister    • Aneurysm Maternal Grandmother    • No Known Problems Paternal Grandmother    • Heart attack Paternal Grandfather    • No Known Problems Sister        Social History     Socioeconomic History   • Marital status:      Spouse name: Not on file   • Number of children: Not on file   • Years of education: Not on file   • Highest education level: Not on file   Tobacco Use   • Smoking status: Never Smoker   • Smokeless tobacco: Never Used   Substance and Sexual Activity   • Alcohol use: No   • Drug use: No   • Sexual activity: Yes     Partners: Male     Birth control/protection: None   Social History Narrative    Caffeine: 1 cup daily       No Known Allergies      Current Outpatient Medications:   •  albuterol sulfate HFA (VENTOLIN HFA) 108 (90 Base) MCG/ACT inhaler, Inhale 3 puffs As Needed., Disp: , Rfl:   •  methocarbamol (ROBAXIN) 500 MG tablet, Take 1 tablet by mouth 3 (Three) Times a Day., Disp: 14 tablet, Rfl: 0  •  ferrous sulfate 325 (65 FE) MG tablet, Take 325 mg by mouth Daily With Breakfast., Disp: , Rfl:   •  Norethin Ace-Eth Estrad-FE 1-20 MG-MCG(24) capsule, Take 1 tablet by mouth Daily., Disp: 84 capsule, Rfl: 1  •  Prenatal Vit-Fe Fumarate-FA (PRENATAL, CLASSIC, VITAMIN) 28-0.8 MG tablet tablet, Take 1 tablet by mouth Daily., Disp: , Rfl:     The following portions of the patient's history were reviewed today and updated as appropriate: allergies, current medications, past family history, past medical history, past social history, past surgical history and problem list     Review of Systems   Constitution: Positive for malaise/fatigue. Negative for chills and fever.   Eyes: Negative.    Cardiovascular: Negative for chest pain, claudication, cyanosis, dyspnea on exertion,  "irregular heartbeat, leg swelling, near-syncope, orthopnea, palpitations, paroxysmal nocturnal dyspnea and syncope.   Respiratory: Negative for cough, shortness of breath and snoring.    Endocrine: Negative.    Hematologic/Lymphatic: Does not bruise/bleed easily.   Skin: Negative for poor wound healing.   Musculoskeletal: Positive for back pain.   Gastrointestinal: Positive for diarrhea. Negative for abdominal pain, heartburn, hematemesis, melena, nausea and vomiting.   Genitourinary: Negative.  Negative for hematuria.   Neurological: Positive for headaches.   Psychiatric/Behavioral: Positive for memory loss.   Allergic/Immunologic: Negative.        Objective:     Vitals:    10/11/19 1010 10/11/19 1012 10/11/19 1014   BP: 139/70 127/72 135/84   BP Location: Right arm Left arm Left arm   Patient Position: Sitting Sitting Standing   Cuff Size: Adult Adult Adult   Pulse: 104  107   Resp: 18     Temp: 96.6 °F (35.9 °C)     TempSrc: Temporal     SpO2: 99%  98%   Weight: 85.3 kg (188 lb)     Height: 154.9 cm (61\")         Physical Exam   Constitutional: She is oriented to person, place, and time. She appears well-developed and well-nourished. No distress.   HENT:   Head: Normocephalic.   Eyes: Conjunctivae are normal. Pupils are equal, round, and reactive to light.   Neck: Neck supple. No JVD present. No thyromegaly present.   Cardiovascular: Normal rate, regular rhythm, normal heart sounds and intact distal pulses. Exam reveals no gallop and no friction rub.   No murmur heard.  Pulmonary/Chest: Effort normal and breath sounds normal. No respiratory distress. She has no wheezes. She has no rales. She exhibits no tenderness.   Abdominal: Soft. Bowel sounds are normal.   Musculoskeletal: Normal range of motion. She exhibits no edema.   Neurological: She is alert and oriented to person, place, and time.   Skin: Skin is warm and dry.   Psychiatric: She has a normal mood and affect. Her behavior is normal. Thought content " normal.   Vitals reviewed.      Lab and Diagnostic Review:    EKG 10/5/19 NSR    Assessment and Plan:   1. Syncope and collapse  - Adult Transthoracic Echo Complete W/ Cont if Necessary Per Protocol; Future  - Mobile Cardiac Outpatient Telemetry; Future    2. Heart murmur  - Adult Transthoracic Echo Complete W/ Cont if Necessary Per Protocol; Future    3. Postconcussion syndrome  Encouraged her to follow up with PCP for ongoing headache, neck and back pain    RV in 6 weeks      It has been a pleasure to participate in the care of this patient.  Patient was instructed to call the Heart and Valve Center with any questions, concerns, or worsening symptoms.    *Please note that portions of this note were completed with a voice recognition program. Efforts were made to edit the dictations, but occasionally words are mistranscribed.

## 2019-10-11 ENCOUNTER — HOSPITAL ENCOUNTER (OUTPATIENT)
Dept: CARDIOLOGY | Facility: HOSPITAL | Age: 29
Discharge: HOME | End: 2019-10-11

## 2019-10-11 ENCOUNTER — OFFICE VISIT (OUTPATIENT)
Dept: CARDIOLOGY | Facility: HOSPITAL | Age: 29
End: 2019-10-11

## 2019-10-11 VITALS
HEIGHT: 61 IN | WEIGHT: 188 LBS | BODY MASS INDEX: 35.5 KG/M2 | HEART RATE: 107 BPM | RESPIRATION RATE: 18 BRPM | TEMPERATURE: 96.6 F | DIASTOLIC BLOOD PRESSURE: 84 MMHG | OXYGEN SATURATION: 98 % | SYSTOLIC BLOOD PRESSURE: 135 MMHG

## 2019-10-11 DIAGNOSIS — F07.81 POSTCONCUSSION SYNDROME: ICD-10-CM

## 2019-10-11 DIAGNOSIS — R55 SYNCOPE AND COLLAPSE: ICD-10-CM

## 2019-10-11 DIAGNOSIS — R55 SYNCOPE AND COLLAPSE: Primary | ICD-10-CM

## 2019-10-11 DIAGNOSIS — R01.1 HEART MURMUR: ICD-10-CM

## 2019-10-11 PROCEDURE — 99204 OFFICE O/P NEW MOD 45 MIN: CPT | Performed by: NURSE PRACTITIONER

## 2019-10-11 PROCEDURE — 93272 ECG/REVIEW INTERPRET ONLY: CPT | Performed by: INTERNAL MEDICINE

## 2019-10-11 RX ORDER — ALBUTEROL SULFATE 90 UG/1
3 AEROSOL, METERED RESPIRATORY (INHALATION) AS NEEDED
COMMUNITY
Start: 2015-04-14

## 2019-10-11 NOTE — PROGRESS NOTES
[facility] Heart Monitor Documentation    Kenzie Jones  1990  [id number]  10/11/19    HAYDEE Pacheco    [] ZIO XT Patch  Model [id number] Prescribed for N/A Days    · Serial Number: (N + 9 Digits) N   · Apply-By Date on Box:   · USPS Tracking Number:   · USPS Tracking        [x] [employer] BodyGuardian MINI PLUS Mobile Cardiac Telemetry  Model BGMINIPLUS Prescribed for 30 Days    · Serial Number: (BGM + 7 Digits) [id number]  · Shipped-By Date on Box: 10/08/2019  · UPS Tracking Number: [id number]  · UPS Tracking      [] [employer] BodyGuardian MINI Holter Monitor  Model BGMINIEL Prescribed for N/A Days    · Serial Number: (7 Digits)   · Shipped-By Date on Box:   · UPS Tracking Number: [id number]  · UPS Tracking        This monitor was applied to the patient's chest and checked for proper functioning.  Ms. Kenzie Jones was instructed in the proper use of this monitor.  She was given the opportunity to ask questions and left the office with the device 's instruction manual.    Natasha Pal [facility], 10:40 AM, 10/11/19                  [facility]    Answers for HPI/ROS submitted by the patient on 10/11/2019   Back pain  Chronicity: new  Onset: in the past 7 days  Frequency: daily  Progression since onset: waxing and waning  Pain location: lumbar spine, thoracic spine  Pain quality: aching, stabbing  Pain - numeric: 9/10  Pain is: the same all the time  Aggravated by: position, lying down, sitting, standing, stress  Stiffness is present: all day  headaches: Yes  tingling: Yes  Risk factors: recent trauma

## 2019-11-17 ENCOUNTER — APPOINTMENT (OUTPATIENT)
Dept: CARDIOLOGY | Facility: HOSPITAL | Age: 29
End: 2019-11-17

## 2022-06-20 ENCOUNTER — OFFICE VISIT (OUTPATIENT)
Dept: FAMILY MEDICINE CLINIC | Facility: CLINIC | Age: 32
End: 2022-06-20

## 2022-06-20 VITALS
WEIGHT: 195 LBS | BODY MASS INDEX: 36.82 KG/M2 | HEIGHT: 61 IN | DIASTOLIC BLOOD PRESSURE: 74 MMHG | SYSTOLIC BLOOD PRESSURE: 110 MMHG | HEART RATE: 83 BPM | OXYGEN SATURATION: 97 %

## 2022-06-20 DIAGNOSIS — R53.83 FATIGUE, UNSPECIFIED TYPE: ICD-10-CM

## 2022-06-20 DIAGNOSIS — F33.1 MAJOR DEPRESSIVE DISORDER, RECURRENT, MODERATE: ICD-10-CM

## 2022-06-20 DIAGNOSIS — Z13.1 DIABETES MELLITUS SCREENING: ICD-10-CM

## 2022-06-20 DIAGNOSIS — E78.2 HYPERLIPIDEMIA, MIXED: ICD-10-CM

## 2022-06-20 DIAGNOSIS — Z13.29 THYROID DISORDER SCREENING: ICD-10-CM

## 2022-06-20 DIAGNOSIS — Z00.00 ROUTINE PHYSICAL EXAMINATION: Primary | ICD-10-CM

## 2022-06-20 PROCEDURE — 99214 OFFICE O/P EST MOD 30 MIN: CPT | Performed by: PHYSICIAN ASSISTANT

## 2022-06-20 RX ORDER — ESCITALOPRAM OXALATE 20 MG/1
20 TABLET ORAL DAILY
Qty: 90 TABLET | Refills: 1 | Status: SHIPPED | OUTPATIENT
Start: 2022-06-20 | End: 2022-12-13 | Stop reason: SDUPTHER

## 2022-06-20 RX ORDER — ESCITALOPRAM OXALATE 20 MG/1
TABLET ORAL
COMMUNITY
Start: 2022-05-10 | End: 2022-06-20 | Stop reason: SDUPTHER

## 2022-06-20 NOTE — PROGRESS NOTES
"Chief Complaint  Annual Exam    Subjective          Kenzie Jones presents to [facility]  Patient today for annual exam along with medication recheck.  She states the Lexapro continues to work well for her at the 20 mg dosage once a day.  She would like to continue at this time.  Denies any side effects of medication.  She is also here fasting for labs.  Last year did show some elevation to her cholesterol.  She is up-to-date on her GYN exam. Has had some hot flashes and plans to f/up with gyn if her labs are normal.  She is due for her eye and dental exam and plans to get scheduled.     Depression  Visit Type: follow-up  Patient is not experiencing: chest pain, dizziness, palpitations, shortness of breath and suicidal ideas.        Objective   Vital Signs:   /74   Pulse 83   Ht 154.9 cm (61\")   Wt 88.5 kg (195 lb)   SpO2 97%   BMI 36.84 kg/m²     Body mass index is 36.84 kg/m².    Review of Systems   Constitutional: Positive for fatigue.   HENT: Negative for congestion and sore throat.    Respiratory: Negative for cough and shortness of breath.    Cardiovascular: Negative for palpitations.   Gastrointestinal: Negative for abdominal pain, blood in stool, diarrhea, nausea and vomiting.   Neurological: Negative for dizziness and headache.   Psychiatric/Behavioral: Negative for suicidal ideas.       Past History:  Medical History: has a past medical history of Anxiety, Appendicitis, Asthma, Asthma, Cholecystitis, and Depression.   Surgical History: has a past surgical history that includes [place] tooth extraction (); Laparoscopic cholecystectomy (); Laparoscopic appendectomy ();  section (N/A, 2017); Appendectomy; and Gallbladder surgery.   Family History: family history includes Aneurysm in her maternal grandmother; Colon cancer (age of onset: 63) in her maternal grandfather; Heart attack in her paternal grandfather; Hyperlipidemia in her father; " Hypertension in her mother; No Known Problems in her paternal grandmother, sister, and sister; Thyroid disease in her father.   Social History: reports that she has never smoked. She has never used smokeless tobacco. She reports that she does not drink alcohol and does not use drugs.      Current Outpatient Medications:   •  albuterol sulfate  (90 Base) MCG/ACT inhaler, Inhale 3 puffs As Needed., Disp: , Rfl:   •  escitalopram (LEXAPRO) 20 MG tablet, Take 1 tablet by mouth Daily., Disp: 90 tablet, Rfl: 1  •  ferrous sulfate 325 (65 FE) MG tablet, Take 325 mg by mouth Daily With Breakfast., Disp: , Rfl:   •  Norethin Ace-Eth Estrad-FE 1-20 MG-MCG(24) capsule, Take 1 tablet by mouth Daily., Disp: 84 capsule, Rfl: 1  Allergies: Patient has no known allergies.    Physical Exam        Assessment and Plan   Diagnoses and all orders for this visit:    1. Routine physical examination (Primary)  Encouraged healthy diet and exercise.  We will get fasting labs today.  Patient will get in for her eye and dental exam.  2. Hyperlipidemia, mixed  -     Lipid Panel; Future  We will check fasting cholesterol panel.  3. Major depressive disorder, recurrent, moderate (HCC)  Patient is pleased with the current dosage of Lexapro and would like to continue at this time.  Denies any side effects.  Return to clinic prior to recheck as needed.  4. Thyroid disorder screening  -     TSH; Future  We will check TSH with labs.  5. Diabetes mellitus screening  -     Hemoglobin A1c; Future  We will check hemoglobin A1c with labs.  6. Fatigue, unspecified type  -     CBC & Differential; Future  -     Comprehensive Metabolic Panel; Future  -     Vitamin B12; Future    Other orders  -     escitalopram (LEXAPRO) 20 MG tablet; Take 1 tablet by mouth Daily.  Dispense: 90 tablet; Refill: 1            Follow Up {Instructions Charge Capture  Follow-up Communications :23}  Return in about 6 months (around 12/20/2022).  Patient was given  instructions and counseling regarding her condition or for health maintenance advice. Please see specific information pulled into the AVS if appropriate.     Daniella Holt PA-C

## 2022-06-21 LAB
ALBUMIN SERPL-MCNC: 4.2 G/DL (ref 3.8–4.8)
ALBUMIN/GLOB SERPL: 1.6 {RATIO} (ref 1.2–2.2)
ALP SERPL-CCNC: 76 IU/L (ref 44–121)
ALT SERPL-CCNC: 28 IU/L (ref 0–32)
AST SERPL-CCNC: 21 IU/L (ref 0–40)
BASOPHILS # BLD AUTO: 0.1 X10E3/UL (ref 0–0.2)
BASOPHILS NFR BLD AUTO: 1 %
BILIRUB SERPL-MCNC: 0.3 MG/DL (ref 0–1.2)
BUN SERPL-MCNC: 7 MG/DL (ref 6–20)
BUN/CREAT SERPL: 13 (ref 9–23)
CALCIUM SERPL-MCNC: 9.5 MG/DL (ref 8.7–10.2)
CHLORIDE SERPL-SCNC: 103 MMOL/L (ref 96–106)
CHOLEST SERPL-MCNC: 196 MG/DL (ref 100–199)
CO2 SERPL-SCNC: 21 MMOL/L (ref 20–29)
CREAT SERPL-MCNC: 0.55 MG/DL (ref 0.57–1)
EGFRCR SERPLBLD CKD-EPI 2021: 126 ML/MIN/1.73
EOSINOPHIL # BLD AUTO: 0.7 X10E3/UL (ref 0–0.4)
EOSINOPHIL NFR BLD AUTO: 7 %
ERYTHROCYTE [DISTWIDTH] IN BLOOD BY AUTOMATED COUNT: 12.3 % (ref 11.7–15.4)
GLOBULIN SER CALC-MCNC: 2.7 G/DL (ref 1.5–4.5)
GLUCOSE SERPL-MCNC: 100 MG/DL (ref 65–99)
HBA1C MFR BLD: 5.7 % (ref 4.8–5.6)
HCT VFR BLD AUTO: 40.5 % (ref 34–46.6)
HDLC SERPL-MCNC: 17 MG/DL
HGB BLD-MCNC: 13.9 G/DL (ref 11.1–15.9)
IMM GRANULOCYTES # BLD AUTO: 0 X10E3/UL (ref 0–0.1)
IMM GRANULOCYTES NFR BLD AUTO: 0 %
LDLC SERPL CALC-MCNC: 124 MG/DL (ref 0–99)
LYMPHOCYTES # BLD AUTO: 3 X10E3/UL (ref 0.7–3.1)
LYMPHOCYTES NFR BLD AUTO: 31 %
MCH RBC QN AUTO: 32.2 PG (ref 26.6–33)
MCHC RBC AUTO-ENTMCNC: 34.3 G/DL (ref 31.5–35.7)
MCV RBC AUTO: 94 FL (ref 79–97)
MONOCYTES # BLD AUTO: 0.8 X10E3/UL (ref 0.1–0.9)
MONOCYTES NFR BLD AUTO: 9 %
NEUTROPHILS # BLD AUTO: 4.9 X10E3/UL (ref 1.4–7)
NEUTROPHILS NFR BLD AUTO: 52 %
PLATELET # BLD AUTO: 632 X10E3/UL (ref 150–450)
POTASSIUM SERPL-SCNC: 4.5 MMOL/L (ref 3.5–5.2)
PROT SERPL-MCNC: 6.9 G/DL (ref 6–8.5)
RBC # BLD AUTO: 4.32 X10E6/UL (ref 3.77–5.28)
SODIUM SERPL-SCNC: 138 MMOL/L (ref 134–144)
TRIGL SERPL-MCNC: 309 MG/DL (ref 0–149)
TSH SERPL DL<=0.005 MIU/L-ACNC: 1 UIU/ML (ref 0.45–4.5)
VIT B12 SERPL-MCNC: 489 PG/ML (ref 232–1245)
VLDLC SERPL CALC-MCNC: 55 MG/DL (ref 5–40)
WBC # BLD AUTO: 9.5 X10E3/UL (ref 3.4–10.8)

## 2022-06-22 ENCOUNTER — TELEPHONE (OUTPATIENT)
Dept: FAMILY MEDICINE CLINIC | Facility: CLINIC | Age: 32
End: 2022-06-22

## 2022-06-22 DIAGNOSIS — D75.839 THROMBOCYTOSIS: Primary | ICD-10-CM

## 2022-06-22 NOTE — TELEPHONE ENCOUNTER
Please let patient know that her labs showed her platelets remain elevated at 632, normal is  less than 450.  There was elevation on last year's check as well and the recommendation was to follow-up with hematology if remained elevated. Please see if has preference of whom would like to see and will send referral.  Also, her 3 month sugar average is in the early prediabetes range and cholesterol shows elevation of triglycerides of 309 and LDL of 124.  Please work on healthy, low carb diet and exercise to help cholesterol and sugar levels and recommend to recheck fasting in 3 months. May view labs on [facility] [employer]. Thanks.

## 2022-08-09 ENCOUNTER — CONSULT (OUTPATIENT)
Dept: ONCOLOGY | Facility: CLINIC | Age: 32
End: 2022-08-09

## 2022-08-09 VITALS
RESPIRATION RATE: 16 BRPM | SYSTOLIC BLOOD PRESSURE: 131 MMHG | OXYGEN SATURATION: 98 % | TEMPERATURE: 98 F | HEART RATE: 80 BPM | WEIGHT: 192 LBS | BODY MASS INDEX: 36.25 KG/M2 | DIASTOLIC BLOOD PRESSURE: 76 MMHG | HEIGHT: 61 IN

## 2022-08-09 DIAGNOSIS — D75.839 THROMBOCYTOSIS: Primary | ICD-10-CM

## 2022-08-09 PROCEDURE — 99203 OFFICE O/P NEW LOW 30 MIN: CPT | Performed by: INTERNAL MEDICINE

## 2022-08-09 RX ORDER — HYDROXYZINE HYDROCHLORIDE 25 MG/1
25 TABLET, FILM COATED ORAL 3 TIMES DAILY PRN
COMMUNITY

## 2022-08-09 NOTE — PROGRESS NOTES
CHIEF COMPLAINT: Thrombocytosis    REASON FOR REFERRAL: Same      RECORDS OBTAINED  Records of the patients history including those obtained from primary care were reviewed and summarized in detail.    Oncology/Hematology History Overview Note   1.  Thrombocytosis  2.  Leukocytosis  3.  Depression  4.  Hyperlipidemia    Hematology history timeline:  -3/6/2017 white count 12,470 hemoglobin 12.5 platelets 383,000  -3/8/2017 white count 16,690 hemoglobin 11.2 with MCV 98 and normal platelets 307,000.  -10/3/2019 white count 14,020 with hemoglobin 14.3 MCV 95 and platelets 195,000.  -2021 B12 slightly elevated 919 upper limit of normal 914.  CMP unremarkable.  Platelets elevated 601,000 with hemoglobin 14.3 MCV 97 and white count normal 8400  -2022 platelets 632,000 with normal white count 9500 hemoglobin 13.9 with MCV 94 and normal differential.      Thrombocytosis       HISTORY OF PRESENT ILLNESS:  The patient is a 32 y.o.  female, referred for thrombocytosis.  She is also had periodic elevations of her white count and an elevation of her B12 slightly.  No significant anemia over time.    REVIEW OF SYSTEMS:  Does have some hot flash symptomatology and occasional pruritus    Past Medical History:   Diagnosis Date   • Anxiety    • Appendicitis    • Asthma    • Asthma    • Cholecystitis    • Depression      Past Surgical History:   Procedure Laterality Date   • APPENDECTOMY     •  SECTION N/A 2017    Procedure:  SECTION PRIMARY;  Surgeon: Kobe Pruitt MD;  Location: [facility] LABOR DELIVERY;  Service:    • GALLBLADDER SURGERY     • LAPAROSCOPIC APPENDECTOMY     • LAPAROSCOPIC CHOLECYSTECTOMY     • WISDOM TOOTH EXTRACTION         Current Outpatient Medications on File Prior to Visit   Medication Sig Dispense Refill   • albuterol sulfate  (90 Base) MCG/ACT inhaler Inhale 3 puffs As Needed.     • escitalopram (LEXAPRO) 20 MG tablet Take 1 tablet by mouth Daily. 90 tablet 1  "  • ferrous sulfate 325 (65 FE) MG tablet Take 325 mg by mouth Daily With Breakfast.     • hydrOXYzine (ATARAX) 25 MG tablet Take 25 mg by mouth 3 (Three) Times a Day As Needed for Itching.     • Norethin Ace-Eth Estrad-FE 1-20 MG-MCG(24) capsule Take 1 tablet by mouth Daily. 84 capsule 1     No current facility-administered medications on file prior to visit.       No Known Allergies    Social History     Socioeconomic History   • Marital status:    Tobacco Use   • Smoking status: Never Smoker   • Smokeless tobacco: Never Used   Substance and Sexual Activity   • Alcohol use: No   • Drug use: No   • Sexual activity: Yes     Partners: Male     Birth control/protection: None       Family History   Problem Relation Age of Onset   • Colon cancer Maternal Grandfather 63   • Hypertension Mother    • Thyroid disease Father    • Hyperlipidemia Father    • No Known Problems Sister    • Aneurysm Maternal Grandmother    • No Known Problems Paternal Grandmother    • Heart attack Paternal Grandfather    • No Known Problems Sister        PHYSICAL EXAM:  No palpable hepatosplenomegaly or lymphadenopathy.  No rosacea or plethora.  No rashes    /76   Pulse 80   Temp 98 °F (36.7 °C)   Resp 16   Ht 154.9 cm (61\")   Wt 87.1 kg (192 lb)   SpO2 98%   BMI 36.28 kg/m²     ECOG score: 0          Lab Results   Component Value Date    HGB 13.9 06/20/2022    HCT 40.5 06/20/2022    MCV 94 06/20/2022     (H) 06/20/2022    WBC 9.5 06/20/2022    NEUTROABS 4.9 06/20/2022    LYMPHSABS 3.0 06/20/2022    MONOSABS 0.8 06/20/2022    EOSABS 0.7 (H) 06/20/2022    BASOSABS 0.1 06/20/2022     Lab Results   Component Value Date    GLUCOSE 100 (H) 06/20/2022    BUN 7 06/20/2022    CREATININE 0.55 (L) 06/20/2022     06/20/2022    K 4.5 06/20/2022     06/20/2022    CO2 21 06/20/2022    CALCIUM 9.5 06/20/2022    PROTEINTOT 7.7 10/03/2019    ALBUMIN 4.2 06/20/2022    BILITOT 0.3 06/20/2022    ALKPHOS 76 06/20/2022    AST 21 " 06/20/2022    ALT 28 06/20/2022         Assessment & Plan   1.  Thrombocytosis  2.  Leukocytosis  3.  Depression  4.  Hyperlipidemia    Hematology history timeline:  -3/6/2017 white count 12,470 hemoglobin 12.5 platelets 383,000  -3/8/2017 white count 16,690 hemoglobin 11.2 with MCV 98 and normal platelets 307,000.  -10/3/2019 white count 14,020 with hemoglobin 14.3 MCV 95 and platelets 195,000.  -7/27/2021 B12 slightly elevated 919 upper limit of normal 914.  CMP unremarkable.  Platelets elevated 601,000 with hemoglobin 14.3 MCV 97 and white count normal 8400  -6/20/2022 platelets 632,000 with normal white count 9500 hemoglobin 13.9 with MCV 94 and normal differential.     -8/9/2022 [facility] hematology consult: With mild elevation of her B12 and some hot flashes which would be premature at 32, it is not inconceivable that the mild thrombocytosis and periodic leukocytosis could represent myeloproliferative disorder but it would be quite unusual at her age.  As a first step, I will check her CBC, peripheral smear looking for circulating blasts, nucleated red cells, or evidences of myelophthisic process, sedimentation rate, C-reactive protein, CMP looking for liver dysfunction, ferritin, and iron panel.  If that shows no iron deficiency or significant peripheral smear abnormalities, then for peace of mind sake particularly given some vasomotor symptoms, I would order JAK2 with reflex testing and BCR abl gene rearrangement and also liver spleen ultrasound to assess for splenomegaly though I do not detect any on exam.  Still suspect more likely reactive or iron deficiency related at her age though MCV has been normal and never significantly anemic.    Total time of care today inclusive of time spent today prior to her arrival reviewing past records of primary care and collating prior data as outlined above and during visit explaining the differential diagnosis of thrombocytosis and interviewing her as to signs or  symptoms referable to myeloproliferative disorders versus reactive disorders that might be associated with this and after visit ordering tests as outlined above took 30 minutes of patient care time throughout the day today.      Shashi Russell MD    8/9/2022

## 2022-08-11 ENCOUNTER — TELEMEDICINE (OUTPATIENT)
Dept: ONCOLOGY | Facility: CLINIC | Age: 32
End: 2022-08-11

## 2022-08-11 ENCOUNTER — TELEPHONE (OUTPATIENT)
Dept: ONCOLOGY | Facility: OTHER | Age: 32
End: 2022-08-11

## 2022-08-11 DIAGNOSIS — D72.828 OTHER ELEVATED WHITE BLOOD CELL (WBC) COUNT: ICD-10-CM

## 2022-08-11 DIAGNOSIS — D75.839 THROMBOCYTOSIS: Primary | Chronic | ICD-10-CM

## 2022-08-11 LAB
ALBUMIN SERPL-MCNC: 4.5 G/DL (ref 3.8–4.8)
ALBUMIN/GLOB SERPL: 1.7 {RATIO} (ref 1.2–2.2)
ALP SERPL-CCNC: 67 IU/L (ref 44–121)
ALT SERPL-CCNC: 26 IU/L (ref 0–32)
AST SERPL-CCNC: 23 IU/L (ref 0–40)
BASOPHILS # BLD AUTO: 0.1 X10E3/UL (ref 0–0.2)
BASOPHILS NFR BLD AUTO: 1 %
BILIRUB SERPL-MCNC: 0.2 MG/DL (ref 0–1.2)
BUN SERPL-MCNC: 6 MG/DL (ref 6–20)
BUN/CREAT SERPL: 10 (ref 9–23)
CALCIUM SERPL-MCNC: 9.9 MG/DL (ref 8.7–10.2)
CHLORIDE SERPL-SCNC: 101 MMOL/L (ref 96–106)
CO2 SERPL-SCNC: 20 MMOL/L (ref 20–29)
CREAT SERPL-MCNC: 0.59 MG/DL (ref 0.57–1)
CRP SERPL-MCNC: 7 MG/L (ref 0–10)
EGFRCR SERPLBLD CKD-EPI 2021: 123 ML/MIN/1.73
EOSINOPHIL # BLD AUTO: 1.1 X10E3/UL (ref 0–0.4)
EOSINOPHIL NFR BLD AUTO: 8 %
ERYTHROCYTE [DISTWIDTH] IN BLOOD BY AUTOMATED COUNT: 12.4 % (ref 11.7–15.4)
ERYTHROCYTE [SEDIMENTATION RATE] IN BLOOD BY WESTERGREN METHOD: 2 MM/HR (ref 0–32)
FERRITIN SERPL-MCNC: 369 NG/ML (ref 15–150)
GLOBULIN SER CALC-MCNC: 2.7 G/DL (ref 1.5–4.5)
GLUCOSE SERPL-MCNC: 87 MG/DL (ref 65–99)
HCT VFR BLD AUTO: 41.3 % (ref 34–46.6)
HGB BLD-MCNC: 14.1 G/DL (ref 11.1–15.9)
IMM GRANULOCYTES # BLD AUTO: 0 X10E3/UL (ref 0–0.1)
IMM GRANULOCYTES NFR BLD AUTO: 0 %
IRON SERPL-MCNC: 94 UG/DL (ref 27–159)
LYMPHOCYTES # BLD AUTO: 3.9 X10E3/UL (ref 0.7–3.1)
LYMPHOCYTES NFR BLD AUTO: 28 %
MCH RBC QN AUTO: 32.5 PG (ref 26.6–33)
MCHC RBC AUTO-ENTMCNC: 34.1 G/DL (ref 31.5–35.7)
MCV RBC AUTO: 95 FL (ref 79–97)
MONOCYTES # BLD AUTO: 1 X10E3/UL (ref 0.1–0.9)
MONOCYTES NFR BLD AUTO: 7 %
NEUTROPHILS # BLD AUTO: 8 X10E3/UL (ref 1.4–7)
NEUTROPHILS NFR BLD AUTO: 56 %
PATH INTERP BLD-IMP: ABNORMAL
PATH REV BLD -IMP: ABNORMAL
PATHOLOGIST NAME: ABNORMAL
PLATELET # BLD AUTO: 578 X10E3/UL (ref 150–450)
POTASSIUM SERPL-SCNC: 4.5 MMOL/L (ref 3.5–5.2)
PROT SERPL-MCNC: 7.2 G/DL (ref 6–8.5)
RBC # BLD AUTO: 4.34 X10E6/UL (ref 3.77–5.28)
SODIUM SERPL-SCNC: 139 MMOL/L (ref 134–144)
WBC # BLD AUTO: 14.1 X10E3/UL (ref 3.4–10.8)

## 2022-08-11 PROCEDURE — 99214 OFFICE O/P EST MOD 30 MIN: CPT | Performed by: INTERNAL MEDICINE

## 2022-08-11 NOTE — TELEPHONE ENCOUNTER
PT CALLING TO MAKE SURE HER 10 AM VIDEO VISIT WITH DR OLIVA WAS STILL SET FOR TODAY - INFORMED HER HE WAS RUNNING LATE PER ROC[facility]O AND TO JUST STAY SIGNED IN THE VIRTUAL WAITING ROOM. PT V/U.

## 2022-08-11 NOTE — PROGRESS NOTES
Telehealth follow-up visit    CHIEF COMPLAINT: Hot flashes    Problem List:  Oncology/Hematology History Overview Note   1.  Thrombocytosis  2.  Leukocytosis  3.  Depression  4.  Hyperlipidemia    Hematology history timeline:  -3/6/2017 white count 12,470 hemoglobin 12.5 platelets 383,000  -3/8/2017 white count 16,690 hemoglobin 11.2 with MCV 98 and normal platelets 307,000.  -10/3/2019 white count 14,020 with hemoglobin 14.3 MCV 95 and platelets 195,000.  -7/27/2021 B12 slightly elevated 919 upper limit of normal 914.  CMP unremarkable.  Platelets elevated 601,000 with hemoglobin 14.3 MCV 97 and white count normal 8400  -6/20/2022 platelets 632,000 with normal white count 9500 hemoglobin 13.9 with MCV 94 and normal differential.     -8/9/2022 [facility] hematology consult: With mild elevation of her B12 and some hot flashes which would be premature at 32, it is not inconceivable that the mild thrombocytosis and periodic leukocytosis could represent myeloproliferative disorder but it would be quite unusual at her age.  As a first step, I will check her CBC, peripheral smear looking for circulating blasts, nucleated red cells, or evidences of myelophthisic process, sedimentation rate, C-reactive protein, CMP looking for liver dysfunction, ferritin, and iron panel.  If that shows no iron deficiency or significant peripheral smear abnormalities, then for peace of mind sake particularly given some vasomotor symptoms, I would order JAK2 with reflex testing and BCR abl gene rearrangement and also liver spleen ultrasound to assess for splenomegaly though I do not detect any on exam.  Still suspect more likely reactive or iron deficiency related at her age though MCV has been normal and never significantly anemic.    - 8/9/2022 White count 14,100 with platelets 578,000.  Hemoglobin normal 14.1.  Differential unremarkable.  Pathologist review of peripheral smear will pending.  Ferritin 369 upper limit 150.  Iron normal 94.  CMP  "unremarkable.  C-reactive protein normal 7.  Sedimentation rate normal 2.    -2022 [facility] hematology follow-up: No hint of iron deficiency in fact ferritin running a little high but with normal iron stores making HFE gene mutation unlikely and would not go through that right now.  Given the persistent leukocytosis over the years without elevation of the sedimentation rate and with elevated B12 and persistent elevation of the platelet count with some \"hot flashes\" which would be unusual at the age of 32, despite the rarity of myeloproliferative disorder at this age, I will nonetheless check her JAK2 with reflex testing and BCR abl gene rearrangement as well as liver spleen ultrasound and see her back for virtual visit in a couple of weeks.  We will also go over pathology report from her peripheral smear when we see her back.     Thrombocytosis       HISTORY OF PRESENT ILLNESS:  The patient is a 32 y.o. female, here for follow up on management of leukocytosis and thrombocytosis without anemia or iron deficiency.  Only complaint is of hot flashes    Past Medical History:   Diagnosis Date   • Anxiety    • Appendicitis    • Asthma    • Asthma    • Cholecystitis    • Depression      Past Surgical History:   Procedure Laterality Date   • APPENDECTOMY     •  SECTION N/A 2017    Procedure:  SECTION PRIMARY;  Surgeon: Kobe Pruitt MD;  Location: [facility] LABOR DELIVERY;  Service:    • GALLBLADDER SURGERY     • LAPAROSCOPIC APPENDECTOMY     • LAPAROSCOPIC CHOLECYSTECTOMY     • WISDOM TOOTH EXTRACTION         No Known Allergies    Family History and Social History reviewed and changed as necessary    REVIEW OF SYSTEM:   No somatic complaints new since last visit and no erythromelalgia or pruritus    PHYSICAL EXAM:  No visible rosacea over plethora on video visit    There were no vitals filed for this visit.  There were no vitals filed for this visit.         ECOG: (0) Fully Active - " Able to Carry On All Pre-disease Performance Without Restriction    Lab Results   Component Value Date    HGB 13.9 06/20/2022    HCT 40.5 06/20/2022    MCV 94 06/20/2022     (H) 06/20/2022    WBC 9.5 06/20/2022    NEUTROABS 4.9 06/20/2022    LYMPHSABS 3.0 06/20/2022    MONOSABS 0.8 06/20/2022    EOSABS 0.7 (H) 06/20/2022    BASOSABS 0.1 06/20/2022       Lab Results   Component Value Date    GLUCOSE 100 (H) 06/20/2022    BUN 7 06/20/2022    CREATININE 0.55 (L) 06/20/2022     06/20/2022    K 4.5 06/20/2022     06/20/2022    CO2 21 06/20/2022    CALCIUM 9.5 06/20/2022    PROTEINTOT 7.7 10/03/2019    ALBUMIN 4.2 06/20/2022    BILITOT 0.3 06/20/2022    ALKPHOS 76 06/20/2022    AST 21 06/20/2022    ALT 28 06/20/2022             ASSESSMENT & PLAN:  1.  Thrombocytosis  2.  Leukocytosis  3.  Depression  4.  Hyperlipidemia    Hematology history timeline:  -3/6/2017 white count 12,470 hemoglobin 12.5 platelets 383,000  -3/8/2017 white count 16,690 hemoglobin 11.2 with MCV 98 and normal platelets 307,000.  -10/3/2019 white count 14,020 with hemoglobin 14.3 MCV 95 and platelets 195,000.  -7/27/2021 B12 slightly elevated 919 upper limit of normal 914.  CMP unremarkable.  Platelets elevated 601,000 with hemoglobin 14.3 MCV 97 and white count normal 8400  -6/20/2022 platelets 632,000 with normal white count 9500 hemoglobin 13.9 with MCV 94 and normal differential.     -8/9/2022 [facility] hematology consult: With mild elevation of her B12 and some hot flashes which would be premature at 32, it is not inconceivable that the mild thrombocytosis and periodic leukocytosis could represent myeloproliferative disorder but it would be quite unusual at her age.  As a first step, I will check her CBC, peripheral smear looking for circulating blasts, nucleated red cells, or evidences of myelophthisic process, sedimentation rate, C-reactive protein, CMP looking for liver dysfunction, ferritin, and iron panel.  If that shows no  "iron deficiency or significant peripheral smear abnormalities, then for peace of mind sake particularly given some vasomotor symptoms, I would order JAK2 with reflex testing and BCR abl gene rearrangement and also liver spleen ultrasound to assess for splenomegaly though I do not detect any on exam.  Still suspect more likely reactive or iron deficiency related at her age though MCV has been normal and never significantly anemic.    - 8/9/2022 White count 14,100 with platelets 578,000.  Hemoglobin normal 14.1.  Differential unremarkable.  Pathologist review of peripheral smear will pending.  Ferritin 369 upper limit 150.  Iron normal 94.  CMP unremarkable.  C-reactive protein normal 7.  Sedimentation rate normal 2.    -8/11/2022 [facility] hematology follow-up: No hint of iron deficiency in fact ferritin running a little high but with normal iron stores making HFE gene mutation unlikely and would not go through that right now.  Given the persistent leukocytosis over the years without elevation of the sedimentation rate and with elevated B12 and persistent elevation of the platelet count with some \"hot flashes\" which would be unusual at the age of 32, despite the rarity of myeloproliferative disorder at this age, I will nonetheless check her JAK2 with reflex testing and BCR abl gene rearrangement as well as liver spleen ultrasound and see her back for virtual visit in a couple of weeks.  We will also go over pathology report from peripheral smear when I see her back.    Total time of care today inclusive of time spent today prior to patient's arrival reviewing interval labs as outlined above and during visit translating that information and after visit instituting the plan and orders as outlined above took 30 minutes of patient care time throughout the day today.  Shashi Russell MD    08/11/2022      "

## 2022-08-26 ENCOUNTER — LAB (OUTPATIENT)
Dept: LAB | Facility: HOSPITAL | Age: 32
End: 2022-08-26

## 2022-08-26 ENCOUNTER — HOSPITAL ENCOUNTER (OUTPATIENT)
Dept: CARDIOLOGY | Facility: HOSPITAL | Age: 32
Discharge: HOME | End: 2022-08-26

## 2022-08-26 VITALS — WEIGHT: 192 LBS | HEIGHT: 59 IN | BODY MASS INDEX: 38.71 KG/M2

## 2022-08-26 DIAGNOSIS — D75.839 THROMBOCYTOSIS: ICD-10-CM

## 2022-08-26 DIAGNOSIS — D72.828 OTHER ELEVATED WHITE BLOOD CELL (WBC) COUNT: ICD-10-CM

## 2022-08-26 LAB
ALBUMIN SERPL-MCNC: 4.7 G/DL (ref 3.5–5.2)
ALBUMIN/GLOB SERPL: 1.6 G/DL
ALP SERPL-CCNC: 68 U/L (ref 39–117)
ALT SERPL W P-5'-P-CCNC: 33 U/L (ref 1–33)
ANION GAP SERPL CALCULATED.3IONS-SCNC: 12 MMOL/L (ref 5–15)
AST SERPL-CCNC: 24 U/L (ref 1–32)
BASOPHILS # BLD AUTO: 0.1 10*3/MM3 (ref 0–0.2)
BASOPHILS NFR BLD AUTO: 0.9 % (ref 0–1.5)
BILIRUB SERPL-MCNC: 0.2 MG/DL (ref 0–1.2)
BUN SERPL-MCNC: 6 MG/DL (ref 6–20)
BUN/CREAT SERPL: 11.8 (ref 7–25)
CALCIUM SPEC-SCNC: 9.8 MG/DL (ref 8.6–10.5)
CHLORIDE SERPL-SCNC: 105 MMOL/L (ref 98–107)
CO2 SERPL-SCNC: 25 MMOL/L (ref 22–29)
CREAT SERPL-MCNC: 0.51 MG/DL (ref 0.57–1)
CRP SERPL-MCNC: 0.38 MG/DL (ref 0–0.5)
DEPRECATED RDW RBC AUTO: 44.9 FL (ref 37–54)
EGFRCR SERPLBLD CKD-EPI 2021: 128.2 ML/MIN/1.73
EOSINOPHIL # BLD AUTO: 0.79 10*3/MM3 (ref 0–0.4)
EOSINOPHIL NFR BLD AUTO: 7.1 % (ref 0.3–6.2)
ERYTHROCYTE [DISTWIDTH] IN BLOOD BY AUTOMATED COUNT: 12.4 % (ref 12.3–15.4)
ERYTHROCYTE [SEDIMENTATION RATE] IN BLOOD: 14 MM/HR (ref 0–20)
FERRITIN SERPL-MCNC: 449.1 NG/ML (ref 13–150)
GLOBULIN UR ELPH-MCNC: 2.9 GM/DL
GLUCOSE SERPL-MCNC: 104 MG/DL (ref 65–99)
HCT VFR BLD AUTO: 42 % (ref 34–46.6)
HGB BLD-MCNC: 13.8 G/DL (ref 12–15.9)
IMM GRANULOCYTES # BLD AUTO: 0.04 10*3/MM3 (ref 0–0.05)
IMM GRANULOCYTES NFR BLD AUTO: 0.4 % (ref 0–0.5)
IRON 24H UR-MRATE: 111 MCG/DL (ref 37–145)
IRON SATN MFR SERPL: 28 % (ref 20–50)
LYMPHOCYTES # BLD AUTO: 3.14 10*3/MM3 (ref 0.7–3.1)
LYMPHOCYTES NFR BLD AUTO: 28.3 % (ref 19.6–45.3)
MCH RBC QN AUTO: 32 PG (ref 26.6–33)
MCHC RBC AUTO-ENTMCNC: 32.9 G/DL (ref 31.5–35.7)
MCV RBC AUTO: 97.4 FL (ref 79–97)
MONOCYTES # BLD AUTO: 0.81 10*3/MM3 (ref 0.1–0.9)
MONOCYTES NFR BLD AUTO: 7.3 % (ref 5–12)
NEUTROPHILS NFR BLD AUTO: 56 % (ref 42.7–76)
NEUTROPHILS NFR BLD AUTO: 6.22 10*3/MM3 (ref 1.7–7)
NRBC BLD AUTO-RTO: 0 /100 WBC (ref 0–0.2)
PLATELET # BLD AUTO: 549 10*3/MM3 (ref 140–450)
PMV BLD AUTO: 9.3 FL (ref 6–12)
POTASSIUM SERPL-SCNC: 4.1 MMOL/L (ref 3.5–5.2)
PROT SERPL-MCNC: 7.6 G/DL (ref 6–8.5)
RBC # BLD AUTO: 4.31 10*6/MM3 (ref 3.77–5.28)
SODIUM SERPL-SCNC: 142 MMOL/L (ref 136–145)
TIBC SERPL-MCNC: 401 MCG/DL (ref 298–536)
TRANSFERRIN SERPL-MCNC: 269 MG/DL (ref 200–360)
WBC NRBC COR # BLD: 11.1 10*3/MM3 (ref 3.4–10.8)

## 2022-08-26 PROCEDURE — 81206 BCR/ABL1 GENE MAJOR BP: CPT

## 2022-08-26 PROCEDURE — 86140 C-REACTIVE PROTEIN: CPT

## 2022-08-26 PROCEDURE — 84466 ASSAY OF TRANSFERRIN: CPT

## 2022-08-26 PROCEDURE — 36415 COLL VENOUS BLD VENIPUNCTURE: CPT

## 2022-08-26 PROCEDURE — 81207 BCR/ABL1 GENE MINOR BP: CPT

## 2022-08-26 PROCEDURE — 80053 COMPREHEN METABOLIC PANEL: CPT

## 2022-08-26 PROCEDURE — 85025 COMPLETE CBC W/AUTO DIFF WBC: CPT

## 2022-08-26 PROCEDURE — 85652 RBC SED RATE AUTOMATED: CPT

## 2022-08-26 PROCEDURE — 83540 ASSAY OF IRON: CPT

## 2022-08-26 PROCEDURE — 93975 VASCULAR STUDY: CPT

## 2022-08-26 PROCEDURE — 81279 JAK2 GENE TRGT SEQUENCE ALYS: CPT

## 2022-08-26 PROCEDURE — 81270 JAK2 GENE: CPT

## 2022-08-26 PROCEDURE — 81219 CALR GENE COM VARIANTS: CPT

## 2022-08-26 PROCEDURE — 85060 BLOOD SMEAR INTERPRETATION: CPT

## 2022-08-26 PROCEDURE — 81338 MPL GENE COMMON VARIANTS: CPT

## 2022-08-26 PROCEDURE — 82728 ASSAY OF FERRITIN: CPT

## 2022-08-27 LAB
CYTOLOGIST CVX/VAG CYTO: NORMAL
PATH INTERP BLD-IMP: NORMAL

## 2022-08-28 LAB
BH CV VAS HEPATIC PORTAL SPLEEN LENGTH: 7.2 CM
BH CV VAS HEPATIC PORTAL SPLEEN WIDTH: 2.7 CM
BH CV VAS HEPATIC PORTAL VEIN DIAMETER: 0.9 CM
BH CV VAS HEPATOPORTAL AORTA AP DIAM: 1.5 CM
BH CV VAS HEPATOPORTAL HEPATIC V LT DIRECTION: NORMAL
BH CV VAS HEPATOPORTAL HEPATIC V MID DIRECTION: NORMAL
BH CV VAS HEPATOPORTAL HEPATIC V RT DIRECTION: NORMAL
BH CV VAS HEPATOPORTAL IVC FLOW: NORMAL
BH CV VAS HEPATOPORTAL PORTAL V LT INTRA DIRECTION: NORMAL
BH CV VAS HEPATOPORTAL PORTAL V MAIN INTRA DIRECTION: NORMAL
BH CV VAS HEPATOPORTAL PORTAL V PSV: 32.8 CM/S
BH CV VAS HEPATOPORTAL PORTAL V RT INTRA DIRECTION: NORMAL
BH CV VAS HEPATOPORTAL SMV DIRECTION: NORMAL
BH CV VAS HEPATOPORTAL SMV PSV: 30.2 CM/S
BH CV VAS HEPATOPORTAL SPLENIC DIRECTION: NORMAL
BH CV VAS HEPATOPORTAL SPLENIC V PSV: 39.3 CM/S
BH CV VAS SMA AORTA EDV: 16.5 CM/S
BH CV VAS SMA AORTA PSV: 107 CM/S
BH CV VAS SMA HEPATIC A RI: 0.65
BH CV VAS SMA HEPATIC EDV: 34.7 CM/S
BH CV VAS SMA HEPATIC PSV: 100 CM/S
BH CV VAS SMA SPLENIC A RI: 0.54
BH CV VAS SMA SPLENIC EDV: 41.3 CM/S
BH CV VAS SMA SPLENIC PSV: 89.4 CM/S

## 2022-09-06 ENCOUNTER — TELEPHONE (OUTPATIENT)
Dept: ONCOLOGY | Facility: CLINIC | Age: 32
End: 2022-09-06

## 2022-09-06 NOTE — TELEPHONE ENCOUNTER
Called and notified patient that results are not available but she does need a follow up with Dr. Russell for him to review the results next week. She verbalized understanding.

## 2022-09-06 NOTE — TELEPHONE ENCOUNTER
Caller: Kenzie Jones    Relationship: Self    Best call back number: [phone]    What test was performed: LABS    When was the test performed: 08/26 AND 08/30    Where was the test performed:

## 2022-09-07 LAB
CALR EXON 9 MUT ANL BLD/T: NORMAL
CITATION REF LAB TEST: NORMAL
JAK2 GENE MUT ANL BLD/T: NORMAL
JAK2 P.V617F BLD/T QL: NORMAL
LAB DIRECTOR NAME PROVIDER: NORMAL
LABORATORY COMMENT REPORT: NORMAL
LABORATORY COMMENT REPORT: NORMAL
Lab: NORMAL
MPL GENE MUT TESTED BLD/T: NORMAL
MPL P.W515L+W515K+S505N BLD/T QL: NORMAL
REF LAB TEST METHOD: NORMAL
REF LAB TEST METHOD: NORMAL
REFLEX: NORMAL
SERVICE CMNT-IMP: NORMAL
SPECIMEN PREPARATION: NORMAL
SPECIMEN PREPARATION: NORMAL

## 2022-09-08 LAB
INTERPRETATION: NEGATIVE
LAB DIRECTOR NAME PROVIDER: NORMAL
LABORATORY COMMENT REPORT: NORMAL
REF LAB TEST METHOD: NORMAL
T(ABL1,BCR)B2A2/CONTROL BLD/T: NORMAL %
T(ABL1,BCR)B3A2/CONTROL BLD/T: NORMAL %
T(ABL1,BCR)E1A2/CONTROL BLD/T: NORMAL %

## 2022-09-12 ENCOUNTER — TELEMEDICINE (OUTPATIENT)
Dept: ONCOLOGY | Facility: CLINIC | Age: 32
End: 2022-09-12

## 2022-09-12 VITALS — BODY MASS INDEX: 36.28 KG/M2 | HEIGHT: 61 IN

## 2022-09-12 DIAGNOSIS — R79.89 ELEVATED FERRITIN: Primary | ICD-10-CM

## 2022-09-12 PROCEDURE — 99213 OFFICE O/P EST LOW 20 MIN: CPT | Performed by: INTERNAL MEDICINE

## 2022-09-12 NOTE — PROGRESS NOTES
Telehealth follow-up visit  This was an audio and video enabled telemedicine encounter.  Done for COVID-19 risk reduction.  Verbal consent given.  CHIEF COMPLAINT: No new somatic complaints    Problem List:  Oncology/Hematology History Overview Note   1.  Thrombocytosis  2.  Leukocytosis  3.  Depression  4.  Hyperlipidemia    Hematology history timeline:  -3/6/2017 white count 12,470 hemoglobin 12.5 platelets 383,000  -3/8/2017 white count 16,690 hemoglobin 11.2 with MCV 98 and normal platelets 307,000.  -10/3/2019 white count 14,020 with hemoglobin 14.3 MCV 95 and platelets 195,000.  -7/27/2021 B12 slightly elevated 919 upper limit of normal 914.  CMP unremarkable.  Platelets elevated 601,000 with hemoglobin 14.3 MCV 97 and white count normal 8400  -6/20/2022 platelets 632,000 with normal white count 9500 hemoglobin 13.9 with MCV 94 and normal differential.     -8/9/2022 [facility] consult: With mild elevation of her B12 and some hot flashes which would be premature at 32, it is not inconceivable that the mild thrombocytosis and periodic leukocytosis could represent myeloproliferative disorder but it would be quite unusual at her age.  As a first step, I will check her CBC, peripheral smear looking for circulating blasts, nucleated red cells, or evidences of myelophthisic process, sedimentation rate, C-reactive protein, CMP looking for liver dysfunction, ferritin, and iron panel.  If that shows no iron deficiency or significant peripheral smear abnormalities, then for peace of mind sake particularly given some vasomotor symptoms, I would order JAK2 with reflex testing and BCR abl gene rearrangement and also liver spleen ultrasound to assess for splenomegaly though I do not detect any on exam.  Still suspect more likely reactive or iron deficiency related at her age though MCV has been normal and never significantly anemic.    - 8/9/2022 White count 14,100 with platelets 578,000.  Hemoglobin normal 14.1.   "Differential unremarkable.  Pathologist review of peripheral smear will pending.  Ferritin 369 upper limit 150.  Iron normal 94.  CMP unremarkable.  C-reactive protein normal 7.  Sedimentation rate normal 2.    -8/11/2022 [facility] hematology follow-up: No hint of iron deficiency in fact ferritin running a little high but with normal iron stores making HFE gene mutation unlikely and would not go through that right now.  Given the persistent leukocytosis over the years without elevation of the sedimentation rate and with elevated B12 and persistent elevation of the platelet count with some \"hot flashes\" which would be unusual at the age of 32, despite the rarity of myeloproliferative disorder at this age, I will nonetheless check her JAK2 with reflex testing and BCR abl gene rearrangement as well as liver spleen ultrasound and see her back for virtual visit in a couple of weeks.  We will also go over pathology report from her peripheral smear when we see her back.    -8/26/2022 data: White count 11,100 withHematocrit 42% and platelets 549,000.  CMP unremarkable.  Slight increase percentage of eosinophils 7.1% with absolute eosinophil count minimally elevated 790.  Sedimentation rate 14/C-reactive protein 0.38 both normal  Peripheral smear pathology review showed mild leukocytosis with eosinophilia and borderline lymphocytosis.  No atypical lymphocytes, granulocytic dysplasia, or blasts.  Red cells macrocytic and normochromic.  Thrombocytosis with unremarkable platelet morphology.    Ferritin elevated 449.1With iron normal 111 and normal transferrin and total iron binding capacity 401.  Calculated transferrin saturation 27%.  JAK2/POLO R/exon 12-15/MPL mutations all negative.  BCR abl gene rearrangement quantitative PCR negative.    -9/12/2022 [facility] medical oncology hematology follow-up: No molecular evidence of myeloproliferative disorder.  Mild and relatively inconsequential eosinophilia.  Ferritin minimally elevated " "with normal iron and slightly high transferrin saturation.  We will check HFE gene mutation for completeness sake.  Scant elevations of white count, platelet count, and absolute eosinophil count likely inconsequential and reactive.     Thrombocytosis       HISTORY OF PRESENT ILLNESS:  The patient is a 32 y.o. female, here for follow up on management of mild elevation of white count and platelet count and eosinophilia modest with history of asthma    Past Medical History:   Diagnosis Date   • Anxiety    • Appendicitis    • Asthma    • Asthma    • Cholecystitis    • Depression      Past Surgical History:   Procedure Laterality Date   • APPENDECTOMY     •  SECTION N/A 2017    Procedure:  SECTION PRIMARY;  Surgeon: Kobe Pruitt MD;  Location: [facility] LABOR DELIVERY;  Service:    • GALLBLADDER SURGERY     • LAPAROSCOPIC APPENDECTOMY     • LAPAROSCOPIC CHOLECYSTECTOMY     • WISDOM TOOTH EXTRACTION         No Known Allergies    Family History and Social History reviewed and changed as necessary    REVIEW OF SYSTEM:   No new somatic complaints    PHYSICAL EXAM:  No respiratory distress and no audible wheezes on the video visit    Vitals:    22 1546   Height: 154.9 cm (61\")     There were no vitals filed for this visit.       Vitals reviewed.    ECOG: (0) Fully Active - Able to Carry On All Pre-disease Performance Without Restriction    Lab Results   Component Value Date    HGB 13.8 2022    HCT 42.0 2022    MCV 97.4 (H) 2022     (H) 2022    WBC 11.10 (H) 2022    NEUTROABS 6.22 2022    LYMPHSABS 3.14 (H) 2022    MONOSABS 0.81 2022    EOSABS 0.79 (H) 2022    BASOSABS 0.10 2022       Lab Results   Component Value Date    GLUCOSE 104 (H) 2022    BUN 6 2022    CREATININE 0.51 (L) 2022     2022    K 4.1 2022     2022    CO2 25.0 2022    CALCIUM 9.8 2022    " PROTEINTOT 7.6 08/26/2022    ALBUMIN 4.70 08/26/2022    BILITOT 0.2 08/26/2022    ALKPHOS 68 08/26/2022    AST 24 08/26/2022    ALT 33 08/26/2022             ASSESSMENT & PLAN:  1.  Thrombocytosis  2.  Leukocytosis  3.  Depression  4.  Hyperlipidemia    Hematology history timeline:  -3/6/2017 white count 12,470 hemoglobin 12.5 platelets 383,000  -3/8/2017 white count 16,690 hemoglobin 11.2 with MCV 98 and normal platelets 307,000.  -10/3/2019 white count 14,020 with hemoglobin 14.3 MCV 95 and platelets 195,000.  -7/27/2021 B12 slightly elevated 919 upper limit of normal 914.  CMP unremarkable.  Platelets elevated 601,000 with hemoglobin 14.3 MCV 97 and white count normal 8400  -6/20/2022 platelets 632,000 with normal white count 9500 hemoglobin 13.9 with MCV 94 and normal differential.     -8/9/2022 [facility]ology consult: With mild elevation of her B12 and some hot flashes which would be premature at 32, it is not inconceivable that the mild thrombocytosis and periodic leukocytosis could represent myeloproliferative disorder but it would be quite unusual at her age.  As a first step, I will check her CBC, peripheral smear looking for circulating blasts, nucleated red cells, or evidences of myelophthisic process, sedimentation rate, C-reactive protein, CMP looking for liver dysfunction, ferritin, and iron panel.  If that shows no iron deficiency or significant peripheral smear abnormalities, then for peace of mind sake particularly given some vasomotor symptoms, I would order JAK2 with reflex testing and BCR abl gene rearrangement and also liver spleen ultrasound to assess for splenomegaly though I do not detect any on exam.  Still suspect more likely reactive or iron deficiency related at her age though MCV has been normal and never significantly anemic.    - 8/9/2022 White count 14,100 with platelets 578,000.  Hemoglobin normal 14.1.  Differential unremarkable.  Pathologist review of peripheral smear will  "pending.  Ferritin 369 upper limit 150.  Iron normal 94.  CMP unremarkable.  C-reactive protein normal 7.  Sedimentation rate normal 2.    -8/11/2022 [facility] hematology follow-up: No hint of iron deficiency in fact ferritin running a little high but with normal iron stores making HFE gene mutation unlikely and would not go through that right now.  Given the persistent leukocytosis over the years without elevation of the sedimentation rate and with elevated B12 and persistent elevation of the platelet count with some \"hot flashes\" which would be unusual at the age of 32, despite the rarity of myeloproliferative disorder at this age, I will nonetheless check her JAK2 with reflex testing and BCR abl gene rearrangement as well as liver spleen ultrasound and see her back for virtual visit in a couple of weeks.  We will also go over pathology report from her peripheral smear when we see her back.    -8/26/2022 data: White count 11,100 withHematocrit 42% and platelets 549,000.  CMP unremarkable.  Slight increase percentage of eosinophils 7.1% with absolute eosinophil count minimally elevated 790.  Sedimentation rate 14/C-reactive protein 0.38 both normal  Peripheral smear pathology review showed mild leukocytosis with eosinophilia and borderline lymphocytosis.  No atypical lymphocytes, granulocytic dysplasia, or blasts.  Red cells macrocytic and normochromic.  Thrombocytosis with unremarkable platelet morphology.    Ferritin elevated 449.1With iron normal 111 and normal transferrin and total iron binding capacity 401.  Calculated transferrin saturation 27%.  JAK2/POLO R/exon 12-15/MPL mutations all negative.  BCR abl gene rearrangement quantitative PCR negative.  Liver spleen ultrasound unremarkable.    -9/12/2022 [facility] medical oncology hematology follow-up: No molecular evidence of myeloproliferative disorder.  Mild and relatively inconsequential eosinophilia.  Ferritin minimally elevated with normal iron and slightly " high transferrin saturation.  We will check HFE gene mutation for completeness sake.  Scant elevations of white count, platelet count, and absolute eosinophil count likely inconsequential and reactive.    Total time of care today inclusive of time spent today prior to patient's arrival reviewing interval data as outlined above and cataloging it as above and during visit translating that to the patient and putting forth a plan as outlined and after visit ordering the plan as outlined took 15 minutes of patient care time throughout the day today  Shashi Russell MD    09/12/2022

## 2022-12-13 ENCOUNTER — OFFICE VISIT (OUTPATIENT)
Dept: FAMILY MEDICINE CLINIC | Facility: CLINIC | Age: 32
End: 2022-12-13

## 2022-12-13 VITALS
DIASTOLIC BLOOD PRESSURE: 72 MMHG | WEIGHT: 180 LBS | BODY MASS INDEX: 33.99 KG/M2 | SYSTOLIC BLOOD PRESSURE: 118 MMHG | OXYGEN SATURATION: 98 % | HEART RATE: 84 BPM | HEIGHT: 61 IN

## 2022-12-13 DIAGNOSIS — R73.9 HYPERGLYCEMIA: ICD-10-CM

## 2022-12-13 DIAGNOSIS — E78.2 MIXED HYPERLIPIDEMIA: Primary | ICD-10-CM

## 2022-12-13 DIAGNOSIS — H93.13 TINNITUS, BILATERAL: ICD-10-CM

## 2022-12-13 DIAGNOSIS — F33.1 MAJOR DEPRESSIVE DISORDER, RECURRENT, MODERATE: ICD-10-CM

## 2022-12-13 PROCEDURE — 36415 COLL VENOUS BLD VENIPUNCTURE: CPT | Performed by: PHYSICIAN ASSISTANT

## 2022-12-13 PROCEDURE — 99214 OFFICE O/P EST MOD 30 MIN: CPT | Performed by: PHYSICIAN ASSISTANT

## 2022-12-13 RX ORDER — ESCITALOPRAM OXALATE 20 MG/1
20 TABLET ORAL DAILY
Qty: 90 TABLET | Refills: 0 | Status: SHIPPED | OUTPATIENT
Start: 2022-12-13

## 2022-12-13 RX ORDER — FLUTICASONE PROPIONATE 50 MCG
SPRAY, SUSPENSION (ML) NASAL
Qty: 16 G | Refills: 0 | Status: SHIPPED | OUTPATIENT
Start: 2022-12-13

## 2022-12-13 NOTE — PROGRESS NOTES
"Chief Complaint  Med Refill (Check up ) and Earache (Both ears hurting started 5-6 days ago )    Subjective          Kenzie Jones presents to [facility]  History of Present Illness  Patient in  today for medication recheck and refills.  She states the Lexapro does seem to help with her depression symptoms but she is interested in getting in with behavioral health to see if maybe there can be an additional medication that could be helpful.  Denies any SI/HI.  PHQ-9 was 18 today.  She also is here to recheck on her cholesterol which showed elevation to her triglycerides at 309 and hemoglobin A1c of 5.7 on her last labs.  She has been working on diet and exercise and has lost 16 pounds since her last visit.  She states off-and-on for the last several weeks she has been bothered by intermittent ringing to ears as well as discomfort.  Denies any significant nasal congestion.  Denies any fever.  Denies any jaw pain or pain with chewing.  Earache   There is pain in both ears. This is a recurrent problem. The current episode started 1 to 4 weeks ago. There has been no fever. Pertinent negatives include no abdominal pain, coughing, diarrhea, ear discharge, headaches, hearing loss, sore throat or vomiting.   Depression  Visit Type: follow-up  Patient presents with the following symptoms: depressed mood and nervousness/anxiety.  Patient is not experiencing: chest pain, palpitations, shortness of breath, suicidal ideas, suicidal planning and thoughts of death.        Objective   Vital Signs:   /72 (BP Location: Left arm, Patient Position: Sitting, Cuff Size: Large Adult)   Pulse 84   Ht 154.9 cm (61\")   Wt 81.6 kg (180 lb)   SpO2 98%   BMI 34.01 kg/m²     Body mass index is 34.01 kg/m².    Review of Systems   Constitutional: Negative for fever.   HENT: Positive for ear pain and tinnitus. Negative for congestion, ear discharge, hearing loss and sore throat.    Respiratory: Negative " for cough and shortness of breath.    Cardiovascular: Negative for chest pain and palpitations.   Gastrointestinal: Negative for abdominal pain, diarrhea, nausea and vomiting.   Neurological: Negative for dizziness and headache.   Psychiatric/Behavioral: Positive for depressed mood. Negative for suicidal ideas. The patient is nervous/anxious.        Past History:  Medical History: has a past medical history of Anxiety, Appendicitis, Asthma, Asthma, Cholecystitis, and Depression.   Surgical History: has a past surgical history that includes [place] tooth extraction (); Laparoscopic cholecystectomy (); Laparoscopic appendectomy ();  section (N/A, 2017); Appendectomy; and Gallbladder surgery.   Family History: family history includes Aneurysm in her maternal grandmother; Colon cancer (age of onset: 63) in her maternal grandfather; Heart attack in her paternal grandfather; Hyperlipidemia in her father; Hypertension in her mother; No Known Problems in her paternal grandmother, sister, and sister; Thyroid disease in her father.   Social History: reports that she has never smoked. She has never used smokeless tobacco. She reports that she does not drink alcohol and does not use drugs.      Current Outpatient Medications:   •  albuterol sulfate  (90 Base) MCG/ACT inhaler, Inhale 3 puffs As Needed., Disp: , Rfl:   •  escitalopram (LEXAPRO) 20 MG tablet, Take 1 tablet by mouth Daily., Disp: 90 tablet, Rfl: 0  •  hydrOXYzine (ATARAX) 25 MG tablet, Take 25 mg by mouth 3 (Three) Times a Day As Needed for Itching., Disp: , Rfl:   •  Norethin Ace-Eth Estrad-FE 1-20 MG-MCG(24) capsule, Take 1 tablet by mouth Daily., Disp: 84 capsule, Rfl: 1  •  fluticasone (Flonase) 50 MCG/ACT nasal spray, 2 sprays each nostril once a day, Disp: 16 g, Rfl: 0  Allergies: Patient has no known allergies.    Physical Exam  Constitutional:       Appearance: Normal appearance.   HENT:      Right Ear: Tympanic membrane, ear  canal and external ear normal.      Left Ear: Tympanic membrane, ear canal and external ear normal.      Mouth/Throat:      Pharynx: Oropharynx is clear.   Eyes:      Conjunctiva/sclera: Conjunctivae normal.      Pupils: Pupils are equal, round, and reactive to light.   Cardiovascular:      Rate and Rhythm: Normal rate and regular rhythm.      Heart sounds: Normal heart sounds.   Pulmonary:      Effort: Pulmonary effort is normal.      Breath sounds: Normal breath sounds.   Abdominal:      Palpations: Abdomen is soft.      Tenderness: There is no abdominal tenderness.   Neurological:      Mental Status: She is oriented to person, place, and time.   Psychiatric:         Mood and Affect: Mood normal.         Behavior: Behavior normal.             Assessment and Plan   Diagnoses and all orders for this visit:    1. Mixed hyperlipidemia (Primary)  -     Comprehensive Metabolic Panel; Future  -     Lipid Panel; Future  -     Comprehensive Metabolic Panel  -     Lipid Panel  Will recheck fasting labs today- encouraged to continue with healthy diet and exercise.   2. Hyperglycemia  -     Hemoglobin A1c; Future  -     Hemoglobin A1c  Will recheck hga1c with labs.   3. Major depressive disorder, recurrent, moderate (HCC)  Will refill at current dosage and patient given  contact information to get scheduled with behavioral health for consult- -rtc prior to recheck as needed.   4. Tinnitus, bilateral  Will try flonase and recommend to get in with ENT if symptoms persist.   Other orders  -     escitalopram (LEXAPRO) 20 MG tablet; Take 1 tablet by mouth Daily.  Dispense: 90 tablet; Refill: 0  -     fluticasone (Flonase) 50 MCG/ACT nasal spray; 2 sprays each nostril once a day  Dispense: 16 g; Refill: 0            Follow Up   Return in about 6 months (around 6/13/2023).  Patient was given instructions and counseling regarding her condition or for health maintenance advice. Please see specific information pulled into the AVS if  appropriate.     Daniella Holt PA-C

## 2022-12-14 LAB
ALBUMIN SERPL-MCNC: 4.5 G/DL (ref 3.8–4.8)
ALBUMIN/GLOB SERPL: 1.6 {RATIO} (ref 1.2–2.2)
ALP SERPL-CCNC: 67 IU/L (ref 44–121)
ALT SERPL-CCNC: 15 IU/L (ref 0–32)
AST SERPL-CCNC: 16 IU/L (ref 0–40)
BILIRUB SERPL-MCNC: <0.2 MG/DL (ref 0–1.2)
BUN SERPL-MCNC: 8 MG/DL (ref 6–20)
BUN/CREAT SERPL: 15 (ref 9–23)
CALCIUM SERPL-MCNC: 9.7 MG/DL (ref 8.7–10.2)
CHLORIDE SERPL-SCNC: 102 MMOL/L (ref 96–106)
CHOLEST SERPL-MCNC: 190 MG/DL (ref 100–199)
CO2 SERPL-SCNC: 19 MMOL/L (ref 20–29)
CREAT SERPL-MCNC: 0.54 MG/DL (ref 0.57–1)
EGFRCR SERPLBLD CKD-EPI 2021: 126 ML/MIN/1.73
GLOBULIN SER CALC-MCNC: 2.8 G/DL (ref 1.5–4.5)
GLUCOSE SERPL-MCNC: 98 MG/DL (ref 70–99)
HBA1C MFR BLD: 5.5 % (ref 4.8–5.6)
HDLC SERPL-MCNC: 20 MG/DL
LDLC SERPL CALC-MCNC: 127 MG/DL (ref 0–99)
POTASSIUM SERPL-SCNC: 4.7 MMOL/L (ref 3.5–5.2)
PROT SERPL-MCNC: 7.3 G/DL (ref 6–8.5)
SODIUM SERPL-SCNC: 138 MMOL/L (ref 134–144)
TRIGL SERPL-MCNC: 237 MG/DL (ref 0–149)
VLDLC SERPL CALC-MCNC: 43 MG/DL (ref 5–40)

## 2023-05-23 ENCOUNTER — TELEPHONE (OUTPATIENT)
Dept: FAMILY MEDICINE CLINIC | Facility: CLINIC | Age: 33
End: 2023-05-23
Payer: COMMERCIAL

## 2023-05-23 NOTE — TELEPHONE ENCOUNTER
Caller: Kenzie Jones    Relationship to patient: Self    Best call back number: [phone]    Chief complaint: BLOOD WORK FROM ANOTHER PROVIDER    Type of visit: LAB    Requested date: N/A     If rescheduling, when is the original appointment: N/A    Additional notes: PATIENT STATED THAT SHE WOULD LIKE TO COME TO LAB TO GET BLOOD WORK DONE THAT HER PSYCHIATRIC HAS ORDERED FOR HER    PLEASE ADVISE

## 2023-06-13 ENCOUNTER — OFFICE VISIT (OUTPATIENT)
Dept: FAMILY MEDICINE CLINIC | Facility: CLINIC | Age: 33
End: 2023-06-13
Payer: COMMERCIAL

## 2023-06-13 VITALS
DIASTOLIC BLOOD PRESSURE: 80 MMHG | WEIGHT: 180 LBS | SYSTOLIC BLOOD PRESSURE: 128 MMHG | OXYGEN SATURATION: 98 % | TEMPERATURE: 98.1 F | HEIGHT: 61 IN | HEART RATE: 84 BPM | BODY MASS INDEX: 33.99 KG/M2

## 2023-06-13 DIAGNOSIS — R05.3 CHRONIC COUGH: Primary | ICD-10-CM

## 2023-06-13 PROCEDURE — 99213 OFFICE O/P EST LOW 20 MIN: CPT | Performed by: PHYSICIAN ASSISTANT

## 2023-06-13 RX ORDER — VENLAFAXINE HYDROCHLORIDE 150 MG/1
TABLET, EXTENDED RELEASE ORAL
COMMUNITY
Start: 2023-02-01

## 2023-06-13 RX ORDER — ALBUTEROL SULFATE 90 UG/1
AEROSOL, METERED RESPIRATORY (INHALATION)
Qty: 18 G | Refills: 0 | Status: SHIPPED | OUTPATIENT
Start: 2023-06-13

## 2023-06-13 RX ORDER — CEFDINIR 300 MG/1
300 CAPSULE ORAL 2 TIMES DAILY
Qty: 20 CAPSULE | Refills: 0 | Status: SHIPPED | OUTPATIENT
Start: 2023-06-13 | End: 2023-06-23

## 2023-06-13 RX ORDER — FLUTICASONE PROPIONATE 50 MCG
SPRAY, SUSPENSION (ML) NASAL
Qty: 16 G | Refills: 1 | Status: SHIPPED | OUTPATIENT
Start: 2023-06-13

## 2023-06-13 RX ORDER — BUSPIRONE HYDROCHLORIDE 10 MG/1
TABLET ORAL
COMMUNITY
Start: 2023-04-01

## 2023-06-13 NOTE — PROGRESS NOTES
"Chief Complaint  Cough (Cough going on for months had covid in January and cough has not gotten better )    Subjective          Kenzie Jones presents to [facility]  History of Present Illness  Patient on cough that has lasted for the last 4 to 5 months following her COVID infection.  She states this has been productive.  She denies fever.  States has had nasal congestion as well.  She has been taking her antihistamine and Flonase daily.  Denies any reflux symptoms.  Denies abdominal pain.  She does have a history of asthma and uses albuterol as needed and states has had some wheezing intermittently as well.  Cough  The current episode started more than 1 month ago. The cough is Productive of sputum. Associated symptoms include nasal congestion and wheezing. Pertinent negatives include no chest pain, chills, fever, heartburn, sore throat or shortness of breath.     Objective   Vital Signs:   /80 (BP Location: Left arm, Patient Position: Sitting, Cuff Size: Large Adult)   Pulse 84   Temp 98.1 °F (36.7 °C)   Ht 154.9 cm (61\")   Wt 81.6 kg (180 lb)   SpO2 98%   BMI 34.01 kg/m²     Body mass index is 34.01 kg/m².    Review of Systems   Constitutional:  Negative for chills and fever.   HENT:  Negative for sore throat.    Respiratory:  Positive for cough and wheezing. Negative for shortness of breath.    Cardiovascular:  Negative for chest pain.   Gastrointestinal:  Negative for diarrhea, nausea and vomiting.   Neurological:  Negative for dizziness and headache.     Past History:  Medical History: has a past medical history of Anxiety, Appendicitis, Asthma, Asthma, Cholecystitis, and Depression.   Surgical History: has a past surgical history that includes [place] tooth extraction (); Laparoscopic cholecystectomy (); Laparoscopic appendectomy ();  section (N/A, 2017); Appendectomy; and Gallbladder surgery.   Family History: family history includes " Aneurysm in her maternal grandmother; Colon cancer (age of onset: 63) in her maternal grandfather; Heart attack in her paternal grandfather; Hyperlipidemia in her father; Hypertension in her mother; No Known Problems in her paternal grandmother, sister, and sister; Thyroid disease in her father.   Social History: reports that she has never smoked. She has never used smokeless tobacco. She reports that she does not drink alcohol and does not use drugs.      Current Outpatient Medications:     albuterol sulfate  (90 Base) MCG/ACT inhaler, Inhale  2 puffs every 6 hours, as needed for wheezing, Disp: 18 g, Rfl: 0    busPIRone (BUSPAR) 10 MG tablet, , Disp: , Rfl:     fluticasone (Flonase) 50 MCG/ACT nasal spray, 2 sprays each nostril once a day, Disp: 16 g, Rfl: 1    hydrOXYzine (ATARAX) 25 MG tablet, Take 1 tablet by mouth 3 (Three) Times a Day As Needed for Itching., Disp: , Rfl:     Norethin Ace-Eth Estrad-FE 1-20 MG-MCG(24) capsule, Take 1 tablet by mouth Daily., Disp: 84 capsule, Rfl: 1    venlafaxine (EFFEXOR) 150 MG tablet sustained-release 24 hour 24 hr tablet, , Disp: , Rfl:     cefdinir (OMNICEF) 300 MG capsule, Take 1 capsule by mouth 2 (Two) Times a Day for 10 days., Disp: 20 capsule, Rfl: 0  Allergies: Patient has no known allergies.    Physical Exam  Constitutional:       Appearance: Normal appearance.   HENT:      Right Ear: Tympanic membrane normal.      Left Ear: Tympanic membrane normal.      Nose: Nose normal.      Mouth/Throat:      Mouth: Mucous membranes are moist.   Eyes:      Pupils: Pupils are equal, round, and reactive to light.   Cardiovascular:      Rate and Rhythm: Normal rate and regular rhythm.      Heart sounds: Normal heart sounds.   Pulmonary:      Effort: Pulmonary effort is normal.      Breath sounds: Normal breath sounds.   Neurological:      Mental Status: She is alert and oriented to person, place, and time.   Psychiatric:         Mood and Affect: Mood normal.          Behavior: Behavior normal.           Assessment and Plan   Diagnoses and all orders for this visit:    1. Chronic cough (Primary)  -     XR Chest PA & Lateral (In Office)  Will check cxray today. With persistent productive cough, will start on antibiotic and to use expectorant as needed; encouraged good hydration and rest; continue allergy medication and albuterol prn; if symptoms not improving would recommend pulmonology consult ; rtc or to ER for any worsening symptoms if needed  Other orders  -     fluticasone (Flonase) 50 MCG/ACT nasal spray; 2 sprays each nostril once a day  Dispense: 16 g; Refill: 1  -     cefdinir (OMNICEF) 300 MG capsule; Take 1 capsule by mouth 2 (Two) Times a Day for 10 days.  Dispense: 20 capsule; Refill: 0  -     albuterol sulfate  (90 Base) MCG/ACT inhaler; Inhale  2 puffs every 6 hours, as needed for wheezing  Dispense: 18 g; Refill: 0            Follow Up   No follow-ups on file.  Patient was given instructions and counseling regarding her condition or for health maintenance advice. Please see specific information pulled into the AVS if appropriate.     Daniella Holt PA-C

## 2024-01-11 ENCOUNTER — OFFICE VISIT (OUTPATIENT)
Dept: FAMILY MEDICINE CLINIC | Facility: CLINIC | Age: 34
End: 2024-01-11
Payer: COMMERCIAL

## 2024-01-11 VITALS
DIASTOLIC BLOOD PRESSURE: 80 MMHG | OXYGEN SATURATION: 99 % | BODY MASS INDEX: 34.93 KG/M2 | HEART RATE: 95 BPM | WEIGHT: 185 LBS | HEIGHT: 61 IN | SYSTOLIC BLOOD PRESSURE: 116 MMHG

## 2024-01-11 DIAGNOSIS — Z11.59 ENCOUNTER FOR HEPATITIS C SCREENING TEST FOR LOW RISK PATIENT: ICD-10-CM

## 2024-01-11 DIAGNOSIS — Z13.220 LIPID SCREENING: ICD-10-CM

## 2024-01-11 DIAGNOSIS — Z13.1 DIABETES MELLITUS SCREENING: ICD-10-CM

## 2024-01-11 DIAGNOSIS — F33.1 MAJOR DEPRESSIVE DISORDER, RECURRENT, MODERATE: ICD-10-CM

## 2024-01-11 DIAGNOSIS — R53.83 FATIGUE, UNSPECIFIED TYPE: Primary | ICD-10-CM

## 2024-01-11 DIAGNOSIS — F41.9 ANXIETY: ICD-10-CM

## 2024-01-11 PROCEDURE — 99214 OFFICE O/P EST MOD 30 MIN: CPT | Performed by: PHYSICIAN ASSISTANT

## 2024-01-11 NOTE — PROGRESS NOTES
"Chief Complaint  Anxiety    Subjective          Kenzie Jones presents to [facility] PRIMARY CARE  History of Present Illness  Patient in today to discuss anxiety. She has been seeing [facility] last appt in 9/2023. She states has been on several different medications and does not feel has found the right ones yet. She ran out of medications around 2 weeks ago and has not made a f/up appointment yet. She states did do genetic testing to help with medications but has not heard back on results. She states has issues with anxiety and depression. PHQ9 today of 17. Most recent meds were Effexor and Lamictal. Previously had tried Abilify and Buspar.  Denies SI/HI.   Anxiety  Presents for follow-up visit. Symptoms include depressed mood and nervous/anxious behavior. Patient reports no chest pain, dizziness, irritability, nausea, palpitations, shortness of breath or suicidal ideas.     Her past medical history is significant for depression.   Depression  Visit Type: follow-up  Patient presents with the following symptoms: depressed mood, fatigue and nervousness/anxiety.  Patient is not experiencing: chest pain, irritability, palpitations, shortness of breath, suicidal ideas and thoughts of death.          Objective   Vital Signs:   /80   Pulse 95   Ht 154.9 cm (61\")   Wt 83.9 kg (185 lb)   SpO2 99%   BMI 34.96 kg/m²     Body mass index is 34.96 kg/m².    Review of Systems   Constitutional:  Positive for fatigue. Negative for irritability.   HENT:  Negative for congestion and sore throat.    Respiratory:  Negative for cough and shortness of breath.    Cardiovascular:  Negative for chest pain and palpitations.   Gastrointestinal:  Negative for abdominal pain, diarrhea, nausea and vomiting.   Neurological:  Negative for dizziness and headache.   Psychiatric/Behavioral:  Positive for depressed mood. Negative for suicidal ideas. The patient is nervous/anxious.        Past " History:  Medical History: has a past medical history of Anxiety, Appendicitis, Asthma, Asthma, Cholecystitis, and Depression.   Surgical History: has a past surgical history that includes [place] tooth extraction (); Laparoscopic cholecystectomy (); Laparoscopic appendectomy ();  section (N/A, 2017); Appendectomy; and Gallbladder surgery.   Family History: family history includes Aneurysm in her maternal grandmother; Colon cancer (age of onset: 63) in her maternal grandfather; Heart attack in her paternal grandfather; Hyperlipidemia in her father; Hypertension in her mother; No Known Problems in her paternal grandmother, sister, and sister; Thyroid disease in her father.   Social History: reports that she has never smoked. She has never used smokeless tobacco. She reports that she does not drink alcohol and does not use drugs.      Current Outpatient Medications:     albuterol sulfate  (90 Base) MCG/ACT inhaler, Inhale  2 puffs every 6 hours, as needed for wheezing, Disp: 18 g, Rfl: 0    hydrOXYzine (ATARAX) 25 MG tablet, Take 1 tablet by mouth 3 (Three) Times a Day As Needed for Itching., Disp: , Rfl:     Norethin Ace-Eth Estrad-FE 1-20 MG-MCG(24) capsule, Take 1 tablet by mouth Daily., Disp: 84 capsule, Rfl: 1  Allergies: Patient has no known allergies.    Physical Exam  Constitutional:       Appearance: Normal appearance.   HENT:      Right Ear: Tympanic membrane normal.      Left Ear: Tympanic membrane normal.      Mouth/Throat:      Pharynx: Oropharynx is clear.   Eyes:      Conjunctiva/sclera: Conjunctivae normal.      Pupils: Pupils are equal, round, and reactive to light.   Cardiovascular:      Rate and Rhythm: Normal rate and regular rhythm.      Heart sounds: Normal heart sounds.   Pulmonary:      Effort: Pulmonary effort is normal.      Breath sounds: Normal breath sounds.   Abdominal:      Palpations: Abdomen is soft.      Tenderness: There is no abdominal tenderness.    Neurological:      Mental Status: She is oriented to person, place, and time.   Psychiatric:         Mood and Affect: Mood normal.         Behavior: Behavior normal.             Assessment and Plan   Diagnoses and all orders for this visit:    1. Fatigue, unspecified type (Primary)  -     CBC & Differential  -     Comprehensive Metabolic Panel  -     TSH  Encouraged healthy diet and exercise. May rtc for fasting labs.   2. Lipid screening  -     Lipid Panel  Will check fasting lipid panel with labs.   3. Diabetes mellitus screening  -     Hemoglobin A1c  Will check hga1c with labs.   4. Encounter for hepatitis C screening test for low risk patient  -     Hepatitis C Antibody    5. Anxiety  Will call her behavioral health office to see if can get scheduled an appointment for f/up and encouraged patient to keep f/up as directed. RTC or to ER for any acute worsening symptoms if needed.   6. Major depressive disorder, recurrent, moderate            Follow Up   No follow-ups on file.  Patient was given instructions and counseling regarding her condition or for health maintenance advice. Please see specific information pulled into the AVS if appropriate.     Daniella Holt PA-C

## 2024-03-13 RX ORDER — ALBUTEROL SULFATE 90 UG/1
AEROSOL, METERED RESPIRATORY (INHALATION)
Qty: 18 G | Refills: 0 | Status: SHIPPED | OUTPATIENT
Start: 2024-03-13

## 2024-07-01 ENCOUNTER — OFFICE VISIT (OUTPATIENT)
Dept: FAMILY MEDICINE CLINIC | Facility: CLINIC | Age: 34
End: 2024-07-01
Payer: COMMERCIAL

## 2024-07-01 VITALS
OXYGEN SATURATION: 96 % | WEIGHT: 182 LBS | BODY MASS INDEX: 34.36 KG/M2 | HEIGHT: 61 IN | DIASTOLIC BLOOD PRESSURE: 70 MMHG | SYSTOLIC BLOOD PRESSURE: 108 MMHG | HEART RATE: 96 BPM

## 2024-07-01 DIAGNOSIS — Z13.220 LIPID SCREENING: ICD-10-CM

## 2024-07-01 DIAGNOSIS — F41.1 GENERALIZED ANXIETY DISORDER: ICD-10-CM

## 2024-07-01 DIAGNOSIS — Z11.59 ENCOUNTER FOR HEPATITIS C SCREENING TEST FOR LOW RISK PATIENT: ICD-10-CM

## 2024-07-01 DIAGNOSIS — Z13.29 THYROID DISORDER SCREENING: ICD-10-CM

## 2024-07-01 DIAGNOSIS — F33.0 MAJOR DEPRESSIVE DISORDER, RECURRENT, MILD: ICD-10-CM

## 2024-07-01 DIAGNOSIS — Z00.00 ROUTINE PHYSICAL EXAMINATION: Primary | ICD-10-CM

## 2024-07-01 DIAGNOSIS — Z13.1 DIABETES MELLITUS SCREENING: ICD-10-CM

## 2024-07-01 PROCEDURE — 99395 PREV VISIT EST AGE 18-39: CPT | Performed by: PHYSICIAN ASSISTANT

## 2024-07-01 PROCEDURE — 1126F AMNT PAIN NOTED NONE PRSNT: CPT | Performed by: PHYSICIAN ASSISTANT

## 2024-07-01 PROCEDURE — 2014F MENTAL STATUS ASSESS: CPT | Performed by: PHYSICIAN ASSISTANT

## 2024-07-01 RX ORDER — TRAZODONE HYDROCHLORIDE 50 MG/1
TABLET ORAL
COMMUNITY
Start: 2024-06-05

## 2024-07-01 RX ORDER — BUPROPION HYDROCHLORIDE 150 MG/1
TABLET ORAL
COMMUNITY
Start: 2024-04-09

## 2024-07-01 RX ORDER — VILOXAZINE HYDROCHLORIDE 150 MG/1
CAPSULE, EXTENDED RELEASE ORAL
COMMUNITY
Start: 2024-06-10

## 2024-07-01 NOTE — PROGRESS NOTES
"Chief Complaint  Annual Exam (Physical )    Subjective          Kenzie Jones presents to [facility] PRIMARY CARE  History of Present Illness  Patient in today for routine physical exam along with fasting labs. States has been feeling well. She sees psychiatry for her medications and states doing well. Denies any chest pain or shortness of breath. Denies changes to bowel habits. She states is utd on eye and dental exams. She is due for pap smear and states she will call to schedule with her GYN. States menstrual cycles are regular.         Objective   Vital Signs:   /70   Pulse 96   Ht 154.9 cm (61\")   Wt 82.6 kg (182 lb)   SpO2 96%   BMI 34.39 kg/m²     Body mass index is 34.39 kg/m².    Review of Systems   Constitutional:  Negative for fatigue and fever.   HENT:  Negative for congestion and sore throat.    Respiratory:  Negative for cough and shortness of breath.    Cardiovascular:  Negative for chest pain and palpitations.   Gastrointestinal:  Negative for abdominal pain, blood in stool, diarrhea, nausea and vomiting.   Genitourinary:  Negative for dysuria and frequency.   Neurological:  Negative for dizziness and headache.   Psychiatric/Behavioral:  Positive for depressed mood. The patient is nervous/anxious.        Past History:  Medical History: has a past medical history of ADHD (attention deficit hyperactivity disorder) (), Anxiety, Appendicitis, Asthma, Asthma, Cholecystitis, and Depression.   Surgical History: has a past surgical history that includes [place] tooth extraction (); Laparoscopic cholecystectomy (); Laparoscopic appendectomy ();  section (N/A, 2017); Appendectomy; and Gallbladder surgery.   Family History: family history includes Aneurysm in her maternal grandmother; Colon cancer (age of onset: 63) in her maternal grandfather; Heart attack in her paternal grandfather; Hyperlipidemia in her father; Hypertension in her mother; No " Known Problems in her paternal grandmother, sister, and sister; Thyroid disease in her father.   Social History: reports that she has never smoked. She has never used smokeless tobacco. She reports that she does not drink alcohol and does not use drugs.      Current Outpatient Medications:     albuterol sulfate HFA (Ventolin HFA) 108 (90 Base) MCG/ACT inhaler, INHALE 2 PUFFS BY MOUTH EVERY 6 HOURS AS NEEDED FOR WHEEZING, Disp: 18 g, Rfl: 0    buPROPion XL (WELLBUTRIN XL) 150 MG 24 hr tablet, , Disp: , Rfl:     Norethin Ace-Eth Estrad-FE 1-20 MG-MCG(24) capsule, Take 1 tablet by mouth Daily., Disp: 84 capsule, Rfl: 1    Qelbree 150 MG capsule sustained-release 24 hr, , Disp: , Rfl:     sertraline (ZOLOFT) 50 MG tablet, , Disp: , Rfl:     traZODone (DESYREL) 50 MG tablet, , Disp: , Rfl:   Allergies: Patient has no known allergies.    Physical Exam  Constitutional:       Appearance: Normal appearance.   HENT:      Right Ear: Tympanic membrane normal.      Left Ear: Tympanic membrane normal.      Mouth/Throat:      Pharynx: Oropharynx is clear.   Eyes:      Conjunctiva/sclera: Conjunctivae normal.      Pupils: Pupils are equal, round, and reactive to light.   Cardiovascular:      Rate and Rhythm: Normal rate and regular rhythm.      Heart sounds: Normal heart sounds.   Pulmonary:      Effort: Pulmonary effort is normal.      Breath sounds: Normal breath sounds.   Abdominal:      Palpations: Abdomen is soft.      Tenderness: There is no abdominal tenderness.   Musculoskeletal:      Right lower leg: No edema.      Left lower leg: No edema.   Neurological:      Mental Status: She is oriented to person, place, and time.   Psychiatric:         Mood and Affect: Mood normal.         Behavior: Behavior normal.             Assessment and Plan   Diagnoses and all orders for this visit:    1. Routine physical examination (Primary)  Encouraged healthy diet and exercise. Fasting labs today. Encouraged to make her f/up appt for pap  smear with her GYN.Will keep routine f/up with psychiatry as directed for her anxiety and depression.    2. Lipid screening  Will check fasting lipid screening.   3. Thyroid disorder screening  Will check TSH with labs.   4. Diabetes mellitus screening  Will check hga1c with labs.   5. Encounter for hepatitis C screening test for low risk patient  6. Generalized anxiety disorder  Keep f/up with psychiatry as directed.   7. Major depressive disorder, recurrent, mild  Keep f/up with psychiatry as directed.           Follow Up   No follow-ups on file.  Patient was given instructions and counseling regarding her condition or for health maintenance advice. Please see specific information pulled into the AVS if appropriate.     Daniella Holt PA-C

## 2024-07-02 LAB
ALBUMIN SERPL-MCNC: 4.4 G/DL (ref 3.9–4.9)
ALP SERPL-CCNC: 101 IU/L (ref 44–121)
ALT SERPL-CCNC: 25 IU/L (ref 0–32)
AST SERPL-CCNC: 19 IU/L (ref 0–40)
BASOPHILS # BLD AUTO: 0.1 X10E3/UL (ref 0–0.2)
BASOPHILS NFR BLD AUTO: 1 %
BILIRUB SERPL-MCNC: <0.2 MG/DL (ref 0–1.2)
BUN SERPL-MCNC: 5 MG/DL (ref 6–20)
BUN/CREAT SERPL: 8 (ref 9–23)
CALCIUM SERPL-MCNC: 9.9 MG/DL (ref 8.7–10.2)
CHLORIDE SERPL-SCNC: 100 MMOL/L (ref 96–106)
CHOLEST SERPL-MCNC: 149 MG/DL (ref 100–199)
CO2 SERPL-SCNC: 22 MMOL/L (ref 20–29)
CREAT SERPL-MCNC: 0.6 MG/DL (ref 0.57–1)
EGFRCR SERPLBLD CKD-EPI 2021: 121 ML/MIN/1.73
EOSINOPHIL # BLD AUTO: 0.9 X10E3/UL (ref 0–0.4)
EOSINOPHIL NFR BLD AUTO: 10 %
ERYTHROCYTE [DISTWIDTH] IN BLOOD BY AUTOMATED COUNT: 13 % (ref 11.7–15.4)
GLOBULIN SER CALC-MCNC: 2.8 G/DL (ref 1.5–4.5)
GLUCOSE SERPL-MCNC: 93 MG/DL (ref 70–99)
HBA1C MFR BLD: 6.1 % (ref 4.8–5.6)
HCT VFR BLD AUTO: 43.3 % (ref 34–46.6)
HCV IGG SERPL QL IA: NON REACTIVE
HDLC SERPL-MCNC: 15 MG/DL
HGB BLD-MCNC: 14.5 G/DL (ref 11.1–15.9)
IMM GRANULOCYTES # BLD AUTO: 0 X10E3/UL (ref 0–0.1)
IMM GRANULOCYTES NFR BLD AUTO: 0 %
LDLC SERPL CALC-MCNC: 73 MG/DL (ref 0–99)
LYMPHOCYTES # BLD AUTO: 3.1 X10E3/UL (ref 0.7–3.1)
LYMPHOCYTES NFR BLD AUTO: 34 %
MCH RBC QN AUTO: 31 PG (ref 26.6–33)
MCHC RBC AUTO-ENTMCNC: 33.5 G/DL (ref 31.5–35.7)
MCV RBC AUTO: 93 FL (ref 79–97)
MONOCYTES # BLD AUTO: 0.6 X10E3/UL (ref 0.1–0.9)
MONOCYTES NFR BLD AUTO: 7 %
NEUTROPHILS # BLD AUTO: 4.2 X10E3/UL (ref 1.4–7)
NEUTROPHILS NFR BLD AUTO: 48 %
PLATELET # BLD AUTO: 501 X10E3/UL (ref 150–450)
POTASSIUM SERPL-SCNC: 4.7 MMOL/L (ref 3.5–5.2)
PROT SERPL-MCNC: 7.2 G/DL (ref 6–8.5)
RBC # BLD AUTO: 4.67 X10E6/UL (ref 3.77–5.28)
SODIUM SERPL-SCNC: 137 MMOL/L (ref 134–144)
TRIGL SERPL-MCNC: 377 MG/DL (ref 0–149)
TSH SERPL DL<=0.005 MIU/L-ACNC: 0.82 UIU/ML (ref 0.45–4.5)
VLDLC SERPL CALC-MCNC: 61 MG/DL (ref 5–40)
WBC # BLD AUTO: 9 X10E3/UL (ref 3.4–10.8)

## 2024-07-03 DIAGNOSIS — E78.2 MIXED HYPERLIPIDEMIA: Primary | ICD-10-CM

## 2024-07-16 ENCOUNTER — OFFICE VISIT (OUTPATIENT)
Dept: CARDIOLOGY | Facility: CLINIC | Age: 34
End: 2024-07-16
Payer: COMMERCIAL

## 2024-07-16 VITALS
DIASTOLIC BLOOD PRESSURE: 82 MMHG | OXYGEN SATURATION: 99 % | HEART RATE: 94 BPM | SYSTOLIC BLOOD PRESSURE: 124 MMHG | RESPIRATION RATE: 18 BRPM | HEIGHT: 61 IN | WEIGHT: 179.6 LBS | BODY MASS INDEX: 33.91 KG/M2

## 2024-07-16 DIAGNOSIS — R00.2 PALPITATIONS: ICD-10-CM

## 2024-07-16 DIAGNOSIS — D75.839 THROMBOCYTOSIS: Chronic | ICD-10-CM

## 2024-07-16 DIAGNOSIS — E78.2 MIXED HYPERLIPIDEMIA: Primary | ICD-10-CM

## 2024-07-16 PROCEDURE — 93000 ELECTROCARDIOGRAM COMPLETE: CPT | Performed by: INTERNAL MEDICINE

## 2024-07-16 PROCEDURE — 99204 OFFICE O/P NEW MOD 45 MIN: CPT | Performed by: INTERNAL MEDICINE

## 2024-07-16 PROCEDURE — 1160F RVW MEDS BY RX/DR IN RCRD: CPT | Performed by: INTERNAL MEDICINE

## 2024-07-16 PROCEDURE — 1159F MED LIST DOCD IN RCRD: CPT | Performed by: INTERNAL MEDICINE

## 2024-07-16 RX ORDER — NIACIN 500 MG/1
500 TABLET, EXTENDED RELEASE ORAL NIGHTLY
Qty: 90 TABLET | Refills: 3 | Status: SHIPPED | OUTPATIENT
Start: 2024-07-16

## 2024-07-16 RX ORDER — ASPIRIN 81 MG/1
81 TABLET ORAL DAILY
Qty: 90 TABLET | Refills: 3 | Status: SHIPPED | OUTPATIENT
Start: 2024-07-16

## 2024-07-16 NOTE — ASSESSMENT & PLAN NOTE
Past workup negative.  Patient to monitor blood pressure during palpitations events. Clinical follow-up.  Stress avoidance and exercise.

## 2024-07-16 NOTE — ASSESSMENT & PLAN NOTE
Seen by hematology in the past.  History of clots in the lower extremities and the lungs on her dad side, same side with abnormal lipid profile.  High blood pressure from her mother side.  Patient to check if her dad had thrombocytosis as well.  In the setting of elevated platelets and abnormal lipid profile, as well as family history of early clotting, plan to start niacin and aspirin (partly to prevent side effect of niacin, as well as high platelet counts) to increase HDL.

## 2024-07-16 NOTE — PROGRESS NOTES
[facility]  [facility] CARDIOLOGY  [address] [place] [address]  Dept: [phone]  Dept Fax: [phone]    Date: 2024  Patient: Kenzie Jones  YOB: 1990    New Patient Office Note    Consult Reason:  Ms. Kenzie Jones is a 34 y.o. female who presents to the clinic to [facility] care, seen for mixed hyperlipidemia.   Patient doing well with no complaints.  Infrequent palpitations type strong heartbeat, usually when stressed.  Workup in the past negative, with Holter monitor showing sinus tachycardia.  Infrequent events.  Patient denies angina, orthopnea, PND, lightheadedness, syncope or medications side-effects.  No limitations to exercise.    The following portions of the patient's history were reviewed and updated as appropriate: allergies, current medications, past family history, past medical history, past social history, past surgical history, and problem list.    Medications: No Known Allergies   Current Outpatient Medications   Medication Instructions    albuterol sulfate HFA (Ventolin HFA) 108 (90 Base) MCG/ACT inhaler INHALE 2 PUFFS BY MOUTH EVERY 6 HOURS AS NEEDED FOR WHEEZING    aspirin 81 mg, Oral, Daily    buPROPion XL (WELLBUTRIN XL) 150 MG 24 hr tablet     niacin (NIASPAN) 500 mg, Oral, Nightly    Norethin Ace-Eth Estrad-FE 1-20 MG-MCG(24) capsule 1 tablet, Oral, Daily    Qelbree 150 MG capsule sustained-release 24 hr     sertraline (ZOLOFT) 50 MG tablet     traZODone (DESYREL) 50 MG tablet        Subjective  Past Medical History:   Diagnosis Date    ADHD (attention deficit hyperactivity disorder)     Anxiety     Appendicitis     Asthma     Asthma     Cholecystitis     Depression        Past Surgical History:   Procedure Laterality Date    APPENDECTOMY       SECTION N/A 2017    Procedure:  SECTION PRIMARY;  Surgeon: Kobe Pruitt MD;  Location: [facility] LABOR DELIVERY;  Service:     GALLBLADDER SURGERY       "LAPAROSCOPIC APPENDECTOMY  2008    LAPAROSCOPIC CHOLECYSTECTOMY  2008    WISDOM TOOTH EXTRACTION  2002       Family History   Problem Relation Age of Onset    Hypertension Mother     Thyroid disease Father     Hyperlipidemia Father     No Known Problems Sister     No Known Problems Sister     Aneurysm Maternal Grandmother     Colon cancer Maternal Grandfather 63    No Known Problems Paternal Grandmother     Heart attack Paternal Grandfather         Social History     Socioeconomic History    Marital status:    Tobacco Use    Smoking status: Never    Smokeless tobacco: Never   Vaping Use    Vaping status: Never Used   Substance and Sexual Activity    Alcohol use: No    Drug use: No    Sexual activity: Yes     Partners: Male     Birth control/protection: Pill       Objective  Vitals:    07/16/24 0950   BP: 124/82   BP Location: Right arm   Patient Position: Sitting   Pulse: 94   Resp: 18   SpO2: 99%   Weight: 81.5 kg (179 lb 9.6 oz)   Height: 154.9 cm (61\")   PainSc: 0-No pain     Vitals:    07/16/24 0950   BP: 124/82   BP Location: Right arm   Patient Position: Sitting   Pulse: 94   Resp: 18   SpO2: 99%   Weight: 81.5 kg (179 lb 9.6 oz)   Height: 154.9 cm (61\")        Physical Exam  Constitutional:       Appearance: Healthy appearance. Not in distress.   Eyes:      Pupils: Pupils are equal, round, and reactive to light.   HENT:    Mouth/Throat:      Mouth: Mucous membranes are moist.   Neck:      Vascular: No carotid bruit, hepatojugular reflux, JVD or JVR. JVD normal.   Pulmonary:      Effort: Pulmonary effort is normal.      Breath sounds: Normal breath sounds. No wheezing. No rhonchi. No rales.   Chest:      Chest wall: Not tender to palpatation.   Cardiovascular:      PMI at left midclavicular line. Normal rate. Regular rhythm. Normal S1 with normal intensity. Normal S2 with normal intensity.       Murmurs: There is no murmur.      No gallop.  No click. No rub.   Pulses:     Carotid: 4+ bilaterally.     " "Radial: 4+ bilaterally.     Popliteal: 4+ bilaterally.     Dorsalis pedis: 4+ bilaterally.  Edema:     Peripheral edema absent.   Abdominal:      General: There is no abdominal bruit.   Skin:     General: Skin is warm.   Neurological:      Mental Status: Alert and oriented to person, place and time.              Labs:  Lab Results   Component Value Date     07/01/2024    K 4.7 07/01/2024     07/01/2024    CO2 22 07/01/2024    BUN 5 (L) 07/01/2024    CREATININE 0.60 07/01/2024    CALCIUM 9.9 07/01/2024    BILITOT <0.2 07/01/2024    ALKPHOS 101 07/01/2024    ALT 25 07/01/2024    AST 19 07/01/2024    GLUCOSE 93 07/01/2024    ALBUMIN 4.4 07/01/2024     Lab Results   Component Value Date    WBC 9.0 07/01/2024    HGB 14.5 07/01/2024    HCT 43.3 07/01/2024     (H) 07/01/2024     No results found for: \"APTT\", \"INR\", \"PTT\"  No results found for: \"CKTOTAL\", \"TROPONINI\", \"TROPONINT\", \"CKMBINDEX\", \"BNP\"  No results found for: \"BNP\", \"PROBNP\"    Lab Results   Component Value Date    CHLPL 149 07/01/2024    TRIG 377 (H) 07/01/2024    HDL 15 (L) 07/01/2024    LDL 73 07/01/2024     Lab Results   Component Value Date    TSH 0.823 07/01/2024       The ASCVD Risk score (Jasper DK, et al., 2019) failed to calculate for the following reasons:    The 2019 ASCVD risk score is only valid for ages 40 to 79     [facility] Diagnostics:    ECG 12 Lead    Date/Time: 7/16/2024 10:40 AM  Performed by: Gennaro Hare MD    Authorized by: Gennaro Hare MD  Comparison: compared with previous ECG from 10/3/2019  Similar to previous ECG  Rhythm: sinus rhythm    Clinical impression: normal ECG          CXR: Results for orders placed in visit on 06/13/23    XR Chest PA & Lateral (In Office)    Narrative  XR CHEST PA AND LATERAL    Date of Exam: 6/13/2023 10:12 AM EDT    Indication: cough    Comparison: None available.    Findings:  The cardiomediastinal silhouette is within normal limits. Lungs are clear. No focal consolidation, " pneumothorax, or significant pleural effusion. Osseous structures grossly intact.    Impression  Impression:  No acute process.      Electronically Signed: Sylvain Cardenas  6/13/2023 1:43 PM EDT  Workstation ID: [id number]     ECHO/MUGA: No results found for this or any previous visit.     STRESS TESTS: No results found for this or any previous visit.     CARDIAC CATH: No results found for this or any previous visit.     DEVICES: No valid procedures specified.   HOLTER: No results found for this or any previous visit.     CT/MRI:  No results found for this or any previous visit.    VASCULAR: No valid procedures specified.     Assessment and Plan  Diagnoses and all orders for this visit:    1. Mixed hyperlipidemia (Primary)  Assessment & Plan:   Lipid abnormalities are newly identified    Plan:  Begin taking the following medication/s; niacin 500 mg ER 1 tab daily.      Discussed medication dosage, use, side effects, and goals of treatment in detail.    Counseled patient on lifestyle modifications to help control hyperlipidemia.   Cholesterol lowering dietary information shared with patient.  Advised patient to exercise for 150 minutes weekly. (30 minute brisk walk, 5 days a week for example)  Weight Loss encouraged  Patient counseled in regards to heart healthy, low fat/ low cholesterol/low sat diet, daily exercise for 30 minutes, low to moderate intensity, and weight loss.  Emphasis on therapeutic lifestyle changes with exercise, weight loss and heart healthy diet/low-fat/low-cholesterol/balanced carbohydrate/avoiding fried food and animal fats/favoring vegetable lipids such as olive oil and avocado oil.    Patient Treatment Goals:   LDL goal is less than 70  HDL more than 40 and triglycerides less than 200.    Consider CT coronaries calcium scoring at age 35-40 to assess need for statin therapy.  Follow-up lipid profile in 6 months with lipoprotein a, APO A1 and APO B.    Orders:  -     niacin (NIASPAN) 500 MG CR  tablet; Take 1 tablet by mouth Every Night.  Dispense: 90 tablet; Refill: 3    2. Thrombocytosis  Assessment & Plan:  Seen by hematology in the past.  History of clots in the lower extremities and the lungs on her dad side, same side with abnormal lipid profile.  High blood pressure from her mother side.  Patient to check if her dad had thrombocytosis as well.  In the setting of elevated platelets and abnormal lipid profile, as well as family history of early clotting, plan to start niacin and aspirin (partly to prevent side effect of niacin, as well as high platelet counts) to increase HDL.    Orders:  -     aspirin 81 MG EC tablet; Take 1 tablet by mouth Daily.  Dispense: 90 tablet; Refill: 3    3. Palpitations  Assessment & Plan:  Past workup negative.  Patient to monitor blood pressure during palpitations events. Clinical follow-up.  Stress avoidance and exercise.    Orders:  -     ECG 12 Lead         Return in about 1 year (around 7/16/2025) for Follow-up with Dr Hare.    There are no Patient Instructions on file for this visit.    Gennaro Hare MD

## 2024-07-16 NOTE — ASSESSMENT & PLAN NOTE
Lipid abnormalities are newly identified    Plan:  Begin taking the following medication/s; niacin 500 mg ER 1 tab daily.      Discussed medication dosage, use, side effects, and goals of treatment in detail.    Counseled patient on lifestyle modifications to help control hyperlipidemia.   Cholesterol lowering dietary information shared with patient.  Advised patient to exercise for 150 minutes weekly. (30 minute brisk walk, 5 days a week for example)  Weight Loss encouraged  Patient counseled in regards to heart healthy, low fat/ low cholesterol/low sat diet, daily exercise for 30 minutes, low to moderate intensity, and weight loss.  Emphasis on therapeutic lifestyle changes with exercise, weight loss and heart healthy diet/low-fat/low-cholesterol/balanced carbohydrate/avoiding fried food and animal fats/favoring vegetable lipids such as olive oil and avocado oil.    Patient Treatment Goals:   LDL goal is less than 70  HDL more than 40 and triglycerides less than 200.    Consider CT coronaries calcium scoring at age 35-40 to assess need for statin therapy.  Follow-up lipid profile in 6 months with lipoprotein a, APO A1 and APO B.

## 2024-07-24 ENCOUNTER — PATIENT ROUNDING (BHMG ONLY) (OUTPATIENT)
Dept: CARDIOLOGY | Facility: CLINIC | Age: 34
End: 2024-07-24
Payer: COMMERCIAL

## 2024-07-24 NOTE — PROGRESS NOTES
July 24, 2024    Hello, may I speak with Kenzie Jones?    My name is PEPE     I am  with [facility]  [facility] CARDIOLOGY  [address] [place] [address].    Before we get started may I verify your date of birth? 1990    I am calling to officially welcome you to our practice and ask about your recent visit. Is this a good time to talk? yes    Tell me about your visit with us. What things went well?  LIKED FACILITY - WAS WHEN DOOR WAS WORKED ON - LIKED NURSE - REALLY LIKE FACILITY - VERY KNOWLEDGEABLY DOCTOR - EXPLAINED WELL       We're always looking for ways to make our patients' experiences even better. Do you have recommendations on ways we may improve?  no           I appreciate you taking the time to speak with me today. Is there anything else I can do for you? no      Thank you, and have a great day.

## 2024-10-07 ENCOUNTER — E-VISIT (OUTPATIENT)
Dept: ADMINISTRATIVE | Facility: OTHER | Age: 34
End: 2024-10-07
Payer: COMMERCIAL

## 2024-10-07 NOTE — E-VISIT ESCALATED
Status: Referred Out  Date: 10/07/2024 12:56:06  Acuity Level: Not applicable  Referral message: Based on the information you provided during your interview, eVisit is not appropriate for treating your condition.  Patient: Kenzie Jones  Patient : 1990  Patient Address: [address] [place] [address]  Patient Phone: [phone]  Clinician Response: Unavailable  Diagnosis: Unavailable  Diagnosis ICD: Unavailable     Patient Interview Questions and Responses: None available

## 2024-12-20 RX ORDER — ALBUTEROL SULFATE 90 UG/1
INHALANT RESPIRATORY (INHALATION)
Qty: 18 G | Refills: 0 | Status: SHIPPED | OUTPATIENT
Start: 2024-12-20

## 2025-02-10 ENCOUNTER — OFFICE VISIT (OUTPATIENT)
Dept: FAMILY MEDICINE CLINIC | Facility: CLINIC | Age: 35
End: 2025-02-10
Payer: COMMERCIAL

## 2025-02-10 VITALS
HEART RATE: 111 BPM | SYSTOLIC BLOOD PRESSURE: 110 MMHG | WEIGHT: 162 LBS | TEMPERATURE: 98.1 F | DIASTOLIC BLOOD PRESSURE: 80 MMHG | HEIGHT: 61 IN | BODY MASS INDEX: 30.58 KG/M2 | OXYGEN SATURATION: 97 %

## 2025-02-10 DIAGNOSIS — J01.00 ACUTE NON-RECURRENT MAXILLARY SINUSITIS: Primary | ICD-10-CM

## 2025-02-10 LAB
EXPIRATION DATE: NORMAL
FLUAV AG UPPER RESP QL IA.RAPID: NOT DETECTED
FLUBV AG UPPER RESP QL IA.RAPID: NOT DETECTED
INTERNAL CONTROL: NORMAL
Lab: NORMAL
SARS-COV-2 AG UPPER RESP QL IA.RAPID: NOT DETECTED

## 2025-02-10 PROCEDURE — 87428 SARSCOV & INF VIR A&B AG IA: CPT | Performed by: PHYSICIAN ASSISTANT

## 2025-02-10 PROCEDURE — 1160F RVW MEDS BY RX/DR IN RCRD: CPT | Performed by: PHYSICIAN ASSISTANT

## 2025-02-10 PROCEDURE — 1159F MED LIST DOCD IN RCRD: CPT | Performed by: PHYSICIAN ASSISTANT

## 2025-02-10 PROCEDURE — 1126F AMNT PAIN NOTED NONE PRSNT: CPT | Performed by: PHYSICIAN ASSISTANT

## 2025-02-10 PROCEDURE — 99213 OFFICE O/P EST LOW 20 MIN: CPT | Performed by: PHYSICIAN ASSISTANT

## 2025-02-10 RX ORDER — HYDROXYZINE HYDROCHLORIDE 10 MG/1
TABLET, FILM COATED ORAL
COMMUNITY
Start: 2025-02-01

## 2025-02-10 RX ORDER — CEFDINIR 300 MG/1
300 CAPSULE ORAL 2 TIMES DAILY
Qty: 20 CAPSULE | Refills: 0 | Status: SHIPPED | OUTPATIENT
Start: 2025-02-10

## 2025-02-10 RX ORDER — LAMOTRIGINE 25 MG/1
TABLET ORAL
COMMUNITY
Start: 2025-02-09

## 2025-02-10 NOTE — PROGRESS NOTES
"Chief Complaint  Facial Pain (Sinus pressure in head off and on for a couple months )    Subjective          Kenzie Jones presents to [facility]  History of Present Illness  Patient in today for evaluation on recurrent sinus pressure and congestion over the last several months.  She states this last week she has had more issues with sinus pressure, nasal congestion, and even tenderness into her upper teeth.  Denies any fevers.  States has had nausea off and on and has had some vomiting of clear phlegm at times.  Denies pregnancy.  States occasional cough.     Sinus pressure and loss of taste and smell  Symptoms are: chronic.   Onset was 1 to 6 months.   Symptoms occur: constantly.  Symptoms include: nasal congestion, cough, headaches, nausea, vertigo, vomiting and weakness.   Pertinent negative symptoms include no abdominal pain, no anorexia, no joint pain, no change in stool, no chest pain, no chills, no diaphoresis, no fatigue, no fever, no joint swelling, no myalgias, no neck pain, no numbness, no rash, no sore throat, no swollen glands, no dysuria and no visual change.   Treatment and/or Medications comments include: Excedrin migraine, tylenol, ibuprofen       Objective   Vital Signs:   /80   Pulse 111   Temp 98.1 °F (36.7 °C)   Ht 154.9 cm (61\")   Wt 73.5 kg (162 lb)   SpO2 97%   BMI 30.61 kg/m²     Body mass index is 30.61 kg/m².    Review of Systems   Constitutional:  Negative for chills, diaphoresis, fatigue and fever.   HENT:  Positive for congestion. Negative for sore throat and swollen glands.    Respiratory:  Positive for cough. Negative for shortness of breath.    Cardiovascular:  Negative for chest pain.   Gastrointestinal:  Positive for nausea and vomiting. Negative for abdominal pain, anorexia and diarrhea.   Genitourinary:  Negative for dysuria.   Musculoskeletal:  Negative for joint pain, myalgias and neck pain.   Skin:  Negative for rash. "   Neurological:  Positive for vertigo and weakness. Negative for numbness.       Past History:  Medical History: has a past medical history of ADHD (attention deficit hyperactivity disorder) (), Anxiety, Appendicitis, Asthma, Asthma, Cholecystitis, and Depression.   Surgical History: has a past surgical history that includes [place] tooth extraction (); Laparoscopic cholecystectomy (); Laparoscopic appendectomy ();  section (N/A, 2017); Appendectomy; and Gallbladder surgery.   Family History: family history includes Aneurysm in her maternal grandmother; Colon cancer (age of onset: 63) in her maternal grandfather; Heart attack in her paternal grandfather; Hyperlipidemia in her father; Hypertension in her mother; No Known Problems in her paternal grandmother, sister, and sister; Thyroid disease in her father.   Social History: reports that she has never smoked. She has never used smokeless tobacco. She reports that she does not drink alcohol and does not use drugs.      Current Outpatient Medications:     albuterol sulfate HFA (Ventolin HFA) 108 (90 Base) MCG/ACT inhaler, INHALE 2 PUFFS BY MOUTH EVERY 6 HOURS AS NEEDED FOR WHEEZING, Disp: 18 g, Rfl: 0    aspirin 81 MG EC tablet, Take 1 tablet by mouth Daily., Disp: 90 tablet, Rfl: 3    buPROPion XL (WELLBUTRIN XL) 150 MG 24 hr tablet, , Disp: , Rfl:     hydrOXYzine (ATARAX) 10 MG tablet, , Disp: , Rfl:     lamoTRIgine (LaMICtal) 25 MG tablet, , Disp: , Rfl:     niacin (NIASPAN) 500 MG CR tablet, Take 1 tablet by mouth Every Night., Disp: 90 tablet, Rfl: 3    Norethin Ace-Eth Estrad-FE 1-20 MG-MCG(24) capsule, Take 1 tablet by mouth Daily., Disp: 84 capsule, Rfl: 1    Qelbree 150 MG capsule sustained-release 24 hr, , Disp: , Rfl:     sertraline (ZOLOFT) 50 MG tablet, , Disp: , Rfl:     traZODone (DESYREL) 50 MG tablet, , Disp: , Rfl:     cefdinir (OMNICEF) 300 MG capsule, Take 1 capsule by mouth 2 (Two) Times a Day., Disp: 20 capsule, Rfl:  0  Allergies: Patient has no known allergies.    Physical Exam  Constitutional:       Appearance: Normal appearance.   HENT:      Head:      Comments: Tender over maxillary sinuses     Right Ear: Tympanic membrane normal.      Left Ear: Tympanic membrane normal.      Mouth/Throat:      Pharynx: Oropharynx is clear.   Eyes:      Conjunctiva/sclera: Conjunctivae normal.      Pupils: Pupils are equal, round, and reactive to light.   Cardiovascular:      Rate and Rhythm: Normal rate and regular rhythm.      Heart sounds: Normal heart sounds.   Pulmonary:      Effort: Pulmonary effort is normal.      Breath sounds: Normal breath sounds.   Abdominal:      Palpations: Abdomen is soft.      Tenderness: There is no abdominal tenderness.   Neurological:      Mental Status: She is oriented to person, place, and time.   Psychiatric:         Mood and Affect: Mood normal.         Behavior: Behavior normal.             Assessment and Plan   Diagnoses and all orders for this visit:    1. Acute non-recurrent maxillary sinusitis (Primary)  -     Covid-19 + Flu A&B AG, Veritor  Rapid covid and flu testing were negative; will start on cefdinir- discussed possible side effects; nasal saline as needed; encouraged good hydration and rest; monitor symptoms and if not improving to call and will get in with ENT for further evaluation   Other orders  -     cefdinir (OMNICEF) 300 MG capsule; Take 1 capsule by mouth 2 (Two) Times a Day.  Dispense: 20 capsule; Refill: 0            Follow Up   Return if symptoms worsen or fail to improve.  Patient was given instructions and counseling regarding her condition or for health maintenance advice. Please see specific information pulled into the AVS if appropriate.     Daniella Holt PA-C

## 2025-02-21 RX ORDER — CEFDINIR 300 MG/1
300 CAPSULE ORAL 2 TIMES DAILY
Qty: 20 CAPSULE | Refills: 0 | OUTPATIENT
Start: 2025-02-21

## (undated) DEVICE — SOL IRR H2O BTL 1000ML STRL

## (undated) DEVICE — 39" SINGLE PATIENT USE HOVERMATT: Brand: SINGLE PATIENT USE HOVERMATT

## (undated) DEVICE — PROXIMATE RH ROTATING HEAD SKIN STAPLERS (35 WIDE) CONTAINS 35 STAINLESS STEEL STAPLES: Brand: PROXIMATE

## (undated) DEVICE — SUT PLAIN  3/0 CT1 27IN 842H

## (undated) DEVICE — SOL IRR NACL 0.9PCT BT 1000ML

## (undated) DEVICE — SUT GUT CHRM 1 CTX 36IN 905H

## (undated) DEVICE — SUT VIC 2/0 CT1 27IN J339H BX/36

## (undated) DEVICE — PK C/SECT 10

## (undated) DEVICE — GLV SURG BIOGEL LTX PF 7 1/2

## (undated) DEVICE — COATED VICRYL  (POLYGLACTIN 910) SUTURE, VIOLET BRAIDED, STERILE, SYNTHETIC ABSORBABLE SUTURE: Brand: COATED VICRYL

## (undated) DEVICE — TRY SPINE BLCK WHITACRE 25G 3X5IN